# Patient Record
Sex: FEMALE | Race: BLACK OR AFRICAN AMERICAN | Employment: OTHER | URBAN - METROPOLITAN AREA
[De-identification: names, ages, dates, MRNs, and addresses within clinical notes are randomized per-mention and may not be internally consistent; named-entity substitution may affect disease eponyms.]

---

## 2019-07-12 ENCOUNTER — TELEPHONE (OUTPATIENT)
Dept: FAMILY MEDICINE CLINIC | Age: 29
End: 2019-07-12

## 2021-02-19 ENCOUNTER — TELEPHONE (OUTPATIENT)
Dept: FAMILY MEDICINE CLINIC | Age: 31
End: 2021-02-19

## 2021-02-19 NOTE — TELEPHONE ENCOUNTER
Patient has not been seen since 2016 and would   like to reestablish care. Would also like a full body PE and labs if   possible. Did not have insurance card at time of call as well.     Please advise  Thank you

## 2021-02-19 NOTE — TELEPHONE ENCOUNTER
----- Message from Jeanne Murguia sent at 2/19/2021  4:59 PM EST -----  Subject: Message to Provider    QUESTIONS  Information for Provider? Patient has not been seen since 2016 and would   like to reestablish care. Would also like a full body PE and labs if   possible. Did not have insurance card at time of call as well.  ---------------------------------------------------------------------------  --------------  CALL BACK INFO  What is the best way for the office to contact you? OK to leave message on   voicemail  Preferred Call Back Phone Number? [phone]  ---------------------------------------------------------------------------  --------------  SCRIPT ANSWERS  Relationship to Patient?  Self

## 2021-03-03 ENCOUNTER — OFFICE VISIT (OUTPATIENT)
Dept: FAMILY MEDICINE CLINIC | Age: 31
End: 2021-03-03
Payer: MEDICAID

## 2021-03-03 VITALS
DIASTOLIC BLOOD PRESSURE: 82 MMHG | RESPIRATION RATE: 18 BRPM | HEIGHT: 64 IN | BODY MASS INDEX: 34.66 KG/M2 | HEART RATE: 75 BPM | SYSTOLIC BLOOD PRESSURE: 118 MMHG | TEMPERATURE: 97.3 F | OXYGEN SATURATION: 98 % | WEIGHT: 203 LBS

## 2021-03-03 DIAGNOSIS — M54.9 UPPER BACK PAIN: ICD-10-CM

## 2021-03-03 DIAGNOSIS — M54.9 MID BACK PAIN: ICD-10-CM

## 2021-03-03 DIAGNOSIS — M25.572 CHRONIC PAIN OF LEFT ANKLE: ICD-10-CM

## 2021-03-03 DIAGNOSIS — B00.9 HERPES SIMPLEX: ICD-10-CM

## 2021-03-03 DIAGNOSIS — E61.1 IRON DEFICIENCY: ICD-10-CM

## 2021-03-03 DIAGNOSIS — Z00.00 ROUTINE GENERAL MEDICAL EXAMINATION AT A HEALTH CARE FACILITY: Primary | ICD-10-CM

## 2021-03-03 DIAGNOSIS — G89.29 CHRONIC PAIN OF LEFT ANKLE: ICD-10-CM

## 2021-03-03 DIAGNOSIS — Z23 NEED FOR VACCINE FOR DT (DIPHTHERIA-TETANUS): ICD-10-CM

## 2021-03-03 PROCEDURE — 90714 TD VACC NO PRESV 7 YRS+ IM: CPT | Performed by: FAMILY MEDICINE

## 2021-03-03 PROCEDURE — 99385 PREV VISIT NEW AGE 18-39: CPT | Performed by: FAMILY MEDICINE

## 2021-03-03 PROCEDURE — G8484 FLU IMMUNIZE NO ADMIN: HCPCS | Performed by: FAMILY MEDICINE

## 2021-03-03 PROCEDURE — 90471 IMMUNIZATION ADMIN: CPT | Performed by: FAMILY MEDICINE

## 2021-03-03 RX ORDER — CLOTRIMAZOLE AND BETAMETHASONE DIPROPIONATE 10; .64 MG/G; MG/G
CREAM TOPICAL
Qty: 1 TUBE | Refills: 0 | Status: CANCELLED | OUTPATIENT
Start: 2021-03-03

## 2021-03-03 SDOH — ECONOMIC STABILITY: FOOD INSECURITY: WITHIN THE PAST 12 MONTHS, YOU WORRIED THAT YOUR FOOD WOULD RUN OUT BEFORE YOU GOT MONEY TO BUY MORE.: NEVER TRUE

## 2021-03-03 SDOH — ECONOMIC STABILITY: INCOME INSECURITY: HOW HARD IS IT FOR YOU TO PAY FOR THE VERY BASICS LIKE FOOD, HOUSING, MEDICAL CARE, AND HEATING?: NOT VERY HARD

## 2021-03-03 ASSESSMENT — PATIENT HEALTH QUESTIONNAIRE - PHQ9
SUM OF ALL RESPONSES TO PHQ QUESTIONS 1-9: 0
2. FEELING DOWN, DEPRESSED OR HOPELESS: 0
SUM OF ALL RESPONSES TO PHQ9 QUESTIONS 1 & 2: 0
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
## 2021-03-06 ENCOUNTER — HOSPITAL ENCOUNTER (OUTPATIENT)
Dept: GENERAL RADIOLOGY | Age: 31
Discharge: HOME | End: 2021-03-06
Payer: MEDICAID

## 2021-03-06 ENCOUNTER — HOSPITAL ENCOUNTER (OUTPATIENT)
Age: 31
Discharge: HOME | End: 2021-03-06
Payer: MEDICAID

## 2021-03-06 DIAGNOSIS — Z00.00 ROUTINE GENERAL MEDICAL EXAMINATION AT A HEALTH CARE FACILITY: ICD-10-CM

## 2021-03-06 DIAGNOSIS — E61.1 IRON DEFICIENCY: ICD-10-CM

## 2021-03-06 DIAGNOSIS — G89.29 CHRONIC PAIN OF LEFT ANKLE: ICD-10-CM

## 2021-03-06 DIAGNOSIS — M25.572 CHRONIC PAIN OF LEFT ANKLE: ICD-10-CM

## 2021-03-06 LAB
A/G RATIO: 1.3 (ref 1.1–2.2)
ALBUMIN SERPL-MCNC: 4.3 G/DL (ref 3.4–5)
ALP BLD-CCNC: 62 U/L (ref 40–129)
ALT SERPL-CCNC: 14 U/L (ref 10–40)
ANION GAP SERPL CALCULATED.3IONS-SCNC: 9 MMOL/L (ref 3–16)
AST SERPL-CCNC: 17 U/L (ref 15–37)
BILIRUB SERPL-MCNC: 0.4 MG/DL (ref 0–1)
BUN BLDV-MCNC: 12 MG/DL (ref 7–20)
CALCIUM SERPL-MCNC: 9 MG/DL (ref 8.3–10.6)
CHLORIDE BLD-SCNC: 102 MMOL/L (ref 99–110)
CHOLESTEROL, TOTAL: 189 MG/DL (ref 0–199)
CO2: 23 MMOL/L (ref 21–32)
CREAT SERPL-MCNC: 0.7 MG/DL (ref 0.6–1.1)
GFR AFRICAN AMERICAN: >60
GFR NON-AFRICAN AMERICAN: >60
GLOBULIN: 3.4 G/DL
GLUCOSE BLD-MCNC: 79 MG/DL (ref 70–99)
HCT VFR BLD CALC: 37 % (ref 36–48)
HDLC SERPL-MCNC: 54 MG/DL (ref 40–60)
HEMOGLOBIN: 12.4 G/DL (ref 12–16)
LDL CHOLESTEROL CALCULATED: 125 MG/DL
MCH RBC QN AUTO: 28.7 PG (ref 26–34)
MCHC RBC AUTO-ENTMCNC: 33.5 G/DL (ref 31–36)
MCV RBC AUTO: 85.5 FL (ref 80–100)
PDW BLD-RTO: 13.6 % (ref 12.4–15.4)
PLATELET # BLD: 220 K/UL (ref 135–450)
PMV BLD AUTO: 8.2 FL (ref 5–10.5)
POTASSIUM SERPL-SCNC: 3.9 MMOL/L (ref 3.5–5.1)
RBC # BLD: 4.32 M/UL (ref 4–5.2)
SODIUM BLD-SCNC: 134 MMOL/L (ref 136–145)
TOTAL PROTEIN: 7.7 G/DL (ref 6.4–8.2)
TRIGL SERPL-MCNC: 52 MG/DL (ref 0–150)
TSH SERPL DL<=0.05 MIU/L-ACNC: 1.69 UIU/ML (ref 0.27–4.2)
VLDLC SERPL CALC-MCNC: 10 MG/DL
WBC # BLD: 3.5 K/UL (ref 4–11)

## 2021-03-06 PROCEDURE — 80061 LIPID PANEL: CPT

## 2021-03-06 PROCEDURE — 73610 X-RAY EXAM OF ANKLE: CPT

## 2021-03-06 PROCEDURE — 36415 COLL VENOUS BLD VENIPUNCTURE: CPT

## 2021-03-06 PROCEDURE — 85027 COMPLETE CBC AUTOMATED: CPT

## 2021-03-06 PROCEDURE — 80053 COMPREHEN METABOLIC PANEL: CPT

## 2021-03-06 PROCEDURE — 84443 ASSAY THYROID STIM HORMONE: CPT

## 2021-03-08 DIAGNOSIS — M25.572 CHRONIC PAIN OF LEFT ANKLE: Primary | ICD-10-CM

## 2021-03-08 DIAGNOSIS — G89.29 CHRONIC PAIN OF LEFT ANKLE: Primary | ICD-10-CM

## 2021-03-24 ENCOUNTER — OFFICE VISIT (OUTPATIENT)
Dept: ORTHOPEDIC SURGERY | Age: 31
End: 2021-03-24
Payer: MEDICAID

## 2021-03-24 VITALS — WEIGHT: 203 LBS | TEMPERATURE: 97.9 F | BODY MASS INDEX: 34.66 KG/M2 | HEIGHT: 64 IN

## 2021-03-24 DIAGNOSIS — M25.572 LEFT ANKLE PAIN, UNSPECIFIED CHRONICITY: Primary | ICD-10-CM

## 2021-03-24 PROCEDURE — 99203 OFFICE O/P NEW LOW 30 MIN: CPT | Performed by: ORTHOPAEDIC SURGERY

## 2021-03-24 PROCEDURE — G8428 CUR MEDS NOT DOCUMENT: HCPCS | Performed by: ORTHOPAEDIC SURGERY

## 2021-03-24 PROCEDURE — G8484 FLU IMMUNIZE NO ADMIN: HCPCS | Performed by: ORTHOPAEDIC SURGERY

## 2021-03-24 PROCEDURE — G8417 CALC BMI ABV UP PARAM F/U: HCPCS | Performed by: ORTHOPAEDIC SURGERY

## 2021-03-24 RX ORDER — NAPROXEN 500 MG/1
500 TABLET ORAL 2 TIMES DAILY WITH MEALS
Qty: 60 TABLET | Refills: 0 | Status: SHIPPED | OUTPATIENT
Start: 2021-03-24 | End: 2021-09-10

## 2021-03-29 ENCOUNTER — TELEPHONE (OUTPATIENT)
Dept: ORTHOPEDIC SURGERY | Age: 31
End: 2021-03-29

## 2021-03-29 PROBLEM — M25.572 LEFT ANKLE PAIN: Status: ACTIVE | Noted: 2021-03-29
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
## 2021-05-10 ENCOUNTER — TELEPHONE (OUTPATIENT)
Dept: FAMILY MEDICINE CLINIC | Age: 31
End: 2021-05-10

## 2021-05-10 NOTE — TELEPHONE ENCOUNTER
I believe she sees a Gynecologist - Dr. Nitza Ruiz at [facility]. Please refer her to Gynecologist's office.

## 2021-09-10 ENCOUNTER — VIRTUAL VISIT (OUTPATIENT)
Dept: FAMILY MEDICINE CLINIC | Age: 31
End: 2021-09-10
Payer: MEDICAID

## 2021-09-10 DIAGNOSIS — M79.644 PAIN IN FINGER OF RIGHT HAND: ICD-10-CM

## 2021-09-10 DIAGNOSIS — M25.532 LEFT WRIST PAIN: Primary | ICD-10-CM

## 2021-09-10 PROCEDURE — 1036F TOBACCO NON-USER: CPT | Performed by: FAMILY MEDICINE

## 2021-09-10 PROCEDURE — G8427 DOCREV CUR MEDS BY ELIG CLIN: HCPCS | Performed by: FAMILY MEDICINE

## 2021-09-10 PROCEDURE — 99214 OFFICE O/P EST MOD 30 MIN: CPT | Performed by: FAMILY MEDICINE

## 2021-09-10 PROCEDURE — G8417 CALC BMI ABV UP PARAM F/U: HCPCS | Performed by: FAMILY MEDICINE

## 2021-09-10 NOTE — LETTER
[address]  Phone: [phone]  Fax: [phone]    Jaxon Israel MD        September 10, 2021     Patient: Anna Sy   YOB: 1990   Date of Visit: 9/10/2021       To Whom it May Concern:    Anna Sy was seen virtually on 9/10/2021. She is to remain off work until Monday , 9/13/21. If you have any questions or concerns, please don't hesitate to call.     Sincerely,         Jaxon Israel MD

## 2021-09-10 NOTE — PROGRESS NOTES
9/10/2021    TELEHEALTH EVALUATION -- Audio/Visual (During KSVTV-87 public health emergency)    Due to [place] 19 outbreak, patient's office visit was converted to a virtual visit. Patient was contacted and agreed to proceed with a virtual visit via [employer]. me  The risks and benefits of converting to a virtual visit were discussed in light of the current infectious disease epidemic. Patient also understood that insurance coverage and co-pays are up to their individual insurance plans. HPI:    Chauncy Severin (:  1990) has requested an audio/video evaluation for the following concern(s):    Left wrist pain. She fell down her back steps on her way to a concert on 18. She noticed the pain the next day and called off work. She iced it and used bandage and propped it up. She used Tylenol . She did not go to ED. She is a . She does not have a classroom and was using her sick time . Her son , 5 yo, had COVID. She has a note for last week for work. She is not mentally ready to go back due to working at [employer]. She was to go back on 21 per her prior note. Her son was negative on 21. He had symptoms started on 21 and likely got COVID from school exposure. Denies fever, chest pain , shortness of breath or cough. Review of Systems    Prior to Visit Medications    Not on File       No Known Allergies    Social History     Tobacco Use    Smoking status: Never Smoker    Smokeless tobacco: Never Used   Substance Use Topics    Alcohol use:  Yes     Alcohol/week: 2.5 standard drinks     Types: 3 drink(s) per week    Drug use: No        Past Medical History:   Diagnosis Date    Acne     Chlamydia infection     Dysmenorrhea     Fibrocystic breast disease     Fracture of radius, left, closed     Hammertoe     Bilateral 5th digits    Herpes simplex     Iron deficiency     Pityriasis rosea        Past Surgical History:   Procedure Laterality Date    OTHER SURGICAL HISTORY  7/28/2014    HAMMERTOE DEFORMITY REPAIR BILATERAL 5TH DIGITS    UMBILICAL HERNIA REPAIR  1991       Health Maintenance   Topic Date Due    COVID-19 Vaccine (1) Never done    Cervical cancer screen  Never done    Flu vaccine (1) 09/01/2021    DTaP/Tdap/Td vaccine (8 - Td or Tdap) 03/03/2031    Hepatitis B vaccine  Completed    Hib vaccine  Completed    Meningococcal (ACWY) vaccine  Completed    Hepatitis C screen  Completed    HIV screen  Completed    Hepatitis A vaccine  Aged Out    Pneumococcal 0-64 years Vaccine  Aged Out    Varicella vaccine  Discontinued       Family History   Problem Relation Age of Onset    High Blood Pressure Mother     Diabetes Mother     High Blood Pressure Father        PHYSICAL EXAMINATION:    Vital Signs: (As obtained by patient/caregiver or practitioner observation)     Patient-Reported Vitals 9/10/2021   Patient-Reported Weight 200         Heart rate= 80  Respiratory rate= 14 Temperature= 98      Constitutional:  Appears well-developed and well-nourished. No apparent distress                              Mental status:  Alert and awake. Oriented to person/place/time. Able to follow commands       Eyes: EOM intact. Sclera-normal. No erythema of conjunctiva. No eye discharge. HENT: Normocephalic, atraumatic. Mouth/Throat: normal. Mucous membranes are moist.      External Ears: Normal       Neck: No visualized mass      Pulmonary/Chest:  Respiratory effort normal.  No visualized signs of difficulty breathing or respiratory distress         Musculoskeletal:   Normal gait with no signs of ataxia. Normal range of motion of neck. Left wrist: decreased ROM due to pain = flexion and hyperextension. Neurological:         No Facial Asymmetry (Cranial nerve 7 motor function) (limited exam to video visit) .   No gaze palsy              Skin:                     No significant exanthematous lesions or discoloration noted on facial skin                                        Psychiatric:          Normal Affect. No Hallucinations           Other pertinent observable physical exam findings:       ASSESSMENT/PLAN:  1. Left wrist pain  - Moist heat . ROM exercises. Modify activities. - Aleve 1-2 po bid prn.   - XR WRIST LEFT (MIN 3 VIEWS); Future and follow up after completed/ prn.   - Note given for work to remain off work until 9/13/21.   - Difficult exam given virtual.  Recommended in office exam if pain persists. 2. Pain in finger of right hand  - Moist heat . ROM exercises. Modify activities. - XR FINGER RIGHT (MIN 2 VIEWS); Future and follow up after completed/ prn.   - Aleve 1-2 po bid prn. The time that was spent with the family/patient addressing care on this video call was 15 minutes. An  electronic signature was used to authenticate this note. --Arnav Batres MD on 9/10/2021 at 3:00 PM      Pursuant to the emergency declaration under the [address] [place] [address], Pending sale to [facility] waiver authority and the [employer] Act, this Virtual  Visit was conducted, with patient's consent, to reduce the patient's risk of exposure to COVID-19 and provide continuity of care for an established patient. Services were provided through a video synchronous discussion virtually to substitute for in-person clinic visit.

## 2021-12-29 ENCOUNTER — OFFICE VISIT (OUTPATIENT)
Dept: SURGERY | Age: 31
End: 2021-12-29
Payer: MEDICAID

## 2021-12-29 VITALS
HEART RATE: 75 BPM | OXYGEN SATURATION: 98 % | SYSTOLIC BLOOD PRESSURE: 111 MMHG | RESPIRATION RATE: 16 BRPM | TEMPERATURE: 98 F | HEIGHT: 64 IN | DIASTOLIC BLOOD PRESSURE: 80 MMHG | WEIGHT: 204.4 LBS | BODY MASS INDEX: 34.89 KG/M2

## 2021-12-29 DIAGNOSIS — N62 MACROMASTIA: Primary | ICD-10-CM

## 2021-12-29 PROCEDURE — G8484 FLU IMMUNIZE NO ADMIN: HCPCS | Performed by: SURGERY

## 2021-12-29 PROCEDURE — G8427 DOCREV CUR MEDS BY ELIG CLIN: HCPCS | Performed by: SURGERY

## 2021-12-29 PROCEDURE — G8417 CALC BMI ABV UP PARAM F/U: HCPCS | Performed by: SURGERY

## 2021-12-29 PROCEDURE — 1036F TOBACCO NON-USER: CPT | Performed by: SURGERY

## 2021-12-29 PROCEDURE — 99204 OFFICE O/P NEW MOD 45 MIN: CPT | Performed by: SURGERY

## 2021-12-29 NOTE — PROGRESS NOTES
[facility]    CC: Symptomatic macromastia    REFERRING PHYSICIAN: Wilfrido Ventura MD    HPI: This is a 27 y.o.  female who presents to clinic with desire for breast reduction. Her pertinent breast history include the following:    Last Mammogram: None    Current bra size: 40G  Desired bra size: As small as possible \"A cup is ok\"  Pregnancies/miscarriages:  / 0  Breast feeding: no future plans    Breast Symptoms:    Macromastia Symptoms:  Upper back pain: Yes      Bra strap grooves: Yes      Wears supportive bras (>1 yr): Yes      Tried conservative measures (PT, MDs,etc): Yes (Chiropractor and PT)      Intertrigo: Yes      Head/neck pain: Yes      Headaches: No      Paresthesias of hands/fingers: No      PMHx:   Past Medical History:   Diagnosis Date    Acne     Chlamydia infection     Dysmenorrhea     Fibrocystic breast disease     Fracture of radius, left, closed     Hammertoe     Bilateral 5th digits    Herpes simplex     Iron deficiency     Pityriasis rosea      PSHx:   Past Surgical History:   Procedure Laterality Date    OTHER SURGICAL HISTORY  2014    HAMMERTOE DEFORMITY REPAIR BILATERAL 5TH DIGITS    UMBILICAL HERNIA REPAIR       ALLERGIES: No Known Allergies  SOCIAL: No tobacco, occ ETOH, no IVD  FHx: Past history of breast CA: Yes (niece)   Past family members with breast reduction: Yes (maternal aunt)   Past family members with breast augmentation:No    Meds:   No current outpatient medications on file. No current facility-administered medications for this visit. ROS   Constitutional: Negative for chills and fever. HENT: Negative for congestion, facial swelling, and voice change. Eyes: Negative for photophobia and visual disturbance. Respiratory: Negative for apnea, cough, chest tightness and shortness of breath. Cardiovascular: Negative for chest pain and palpitations.    Gastrointestinal: Negative for dysphagia and early satiety. Genitourinary: Negative for difficulty urinating, dysuria, flank pain, frequency and hematuria. Musculoskeletal: See HPI. Skin: Negative for color change, pallor and rash. Endocrine: negative for tremors, temperature intolerance or polydipsia. Allergic/Immunologic: Negative for new environmental or food allergies. Neurological: See HPI. Hematological: Negative for adenopathy. Psychiatric/Behavioral: Negative for agitation and confusion. EXAM     /80   Pulse 75   Temp 98 °F (36.7 °C)   Resp 16   Ht 5' 4\" (1.626 m)   Wt 204 lb 6.4 oz (92.7 kg)   SpO2 98%   BMI 35.09 kg/m²     GEN: NAD, pleasant, obese  CVS: RRR  PULM: No respiratory distress  HEENT: PERRLA/EOMI; wearing mask hearing appears within normal limits  NECK: Supple with trachea in midline, no masses  EXT: No lymphedema noted  ABD: soft/NT/obese  No palpable hernia  NEURO: No focal deficits, no obvious CN deficits  BACK: Bilateral latiss muscle intact  BREAST: Right larger than Left   R  Ptosis ndgndrndanddndend:nd nd2nd Palpable masses: No     Nipple retraction: No     Palpable axillary lymphadenopathy: No     SN-N: 35.8 cm     N-IMF: 15.8 cm     Breast width: 16.8 cm         L  Ptosis ndgndrndanddndend:nd nd2nd Palpable masses: No     Nipple retraction: No     Palpable axillary lymphadenopathy: No     SN-N: 35.5 cm     N-IMF: 14 cm     Breast width: 17.3 cm      Estimated Reduction Volume (Schnur scale): 600 grams (each)    RADIOLOGY: None    IMP: 27 y.o. female with symptomatic macromastia  PLAN:Would benefit from breast reduction for functional improvement. Additionally, would benefit from abdominoplasty with rectus plication for improvement in her abdominal contour. However, she is going to work to decrease her weight. Will then return in a few months and once weight stability obtained, will submit to insurance and schedule. A discussion regarding surgical options including: reduction mammaplasty was performed with the patient.  Her symptoms of macromastia were discussed in detail and that surgical intervention is focused primarily on relieving upper torso complaints. Clinical photos were obtained. Additionally,discussion regarding the risks including, but not limited to: bleeding (potentially requiring transfusion or reoperation), infection, seroma, reoperation, poor cosmetic outcome, scarring, revisional surgery, nipple loss/complication, nipple malposition, diminished sensation, inability to breastfeed, VTE (DVT/PE), and death was performed. All questions were answered in a satisfactory manner. The patient was counseled at length about the risks of adolfo Covid-19 during their perioperative period and any recovery window from their procedure. The patient was made aware that adolfo Covid-19  may worsen their prognosis for recovering from their procedure  and lend to a higher morbidity and/or mortality risk. All material risks, benefits, and reasonable alternatives including postponing the procedure were discussed. The patient does wish to proceed with the procedure at this time.     Camila Rg MD  [address] [address] Reconstructive Surgery  12/29/21

## 2022-03-02 ENCOUNTER — OFFICE VISIT (OUTPATIENT)
Dept: SURGERY | Age: 32
End: 2022-03-02
Payer: MEDICAID

## 2022-03-02 VITALS
SYSTOLIC BLOOD PRESSURE: 122 MMHG | WEIGHT: 202 LBS | TEMPERATURE: 98 F | HEIGHT: 64 IN | BODY MASS INDEX: 34.49 KG/M2 | HEART RATE: 65 BPM | DIASTOLIC BLOOD PRESSURE: 83 MMHG | OXYGEN SATURATION: 98 % | RESPIRATION RATE: 16 BRPM

## 2022-03-02 DIAGNOSIS — N62 MACROMASTIA: Primary | ICD-10-CM

## 2022-03-02 PROCEDURE — G8427 DOCREV CUR MEDS BY ELIG CLIN: HCPCS | Performed by: SURGERY

## 2022-03-02 PROCEDURE — G8417 CALC BMI ABV UP PARAM F/U: HCPCS | Performed by: SURGERY

## 2022-03-02 PROCEDURE — 1036F TOBACCO NON-USER: CPT | Performed by: SURGERY

## 2022-03-02 PROCEDURE — 99214 OFFICE O/P EST MOD 30 MIN: CPT | Performed by: SURGERY

## 2022-03-02 PROCEDURE — G8484 FLU IMMUNIZE NO ADMIN: HCPCS | Performed by: SURGERY

## 2022-03-02 NOTE — LETTER
Surgery Schedule Request Form  [facility]  [address] [place] [address]. [address] [place] [address]  DATE OF SURGERY: 2022   TIME OF SURGERY: 1:30 pm     Confirmation#:__________________        Surgeon Name: Trev Trotter MD    Phone: [phone]     Fax: [phone]  Procedure Name: 1) Bilateral breast reduction  CPT CODES (required for scheduling):   DIAGNOSIS: N62  Macromastia    LENGTH OF PROCEDURE: 3.5 hours  Patient Status: 23 hour observation    Labs Needed:   CBC _x__  PT/PTT_x__ INR __x__  CMP __x_ EKG _x__   Urine Hcg _x__            PATIENT NAME: Leigh Shea            YOB: 1990  SEX: female   SS #:   PHONE: [phone] (home)     Pre-Op to be done by: PCP  Cardiac Clearance Done by: per PCP    Pt Position:  supine  Patient to meet with Anesthesiology prior to surgery: no     Medications to be stopped 5 days before surgery: anticoagulation     Ancef 2 gm IV OCTOR (NOTE:  If patient weight is > 120 kg, Administer 3 gm)  Other Orders: transexemic acid 1g IV OCTOR; SA; No Decadron    INSURANCE: [facility]                                              SUBSCRIBER NAME: Self   MEMBER ID:   [id number]                            AUTHORIZATION #: [id number]    PCP: Bryan Escudero MD                                      ANESTHESIA:  Joel Remy MD                             FAX TO: [phone]   QUESTIONS? CALL: [address]   Fax   [phone]            Date Of Procedure: 2022    PATIENT:       Leigh Shea                    :  1990      No Known Allergies    No.   PHYSICIAN ORDERS   HUC/  RN      ORDERS WITH CHECK BOXES MUST BE SELECTED. ALL OTHER ORDERS WILL BE AUTOMATICALLY INITIATED. Date / Time of Order:   3/7/22   1:44 PM          Procedure: Bilateral Breast Reduction        1.  Cefazolin (Ancef) 2 gm IV OCTOR   ** NOTE:  If patient weight is > 120 kg, Administer 3 gm         2. ** If PCN allergy, then Clindamycin 600mg IV OCTOR.   ** If prior history of MRSA, Vancomycin 1g IV OCTOR (please check with renal function prior to administration)       3.  Other orders: transexemic acid 1g IV OCTOR; SA; No Decadron     Physician / Surgeon:  Rebekah Garcia MD  Office Ph:  [phone]       Physician Signature:     9/07

## 2022-03-02 NOTE — PROGRESS NOTES
[facility]    CC: Symptomatic macromastia    REFERRING PHYSICIAN: Tremaine Gunter MD    HPI: This is a 34 y.o.  female who presents to clinic with desire for breast reduction. Since her last evaluation, she has lost 2 lbs. Her pertinent breast history include the following:    Last Mammogram: None    Current bra size: 40G  Desired bra size: As small as possible \"A cup is ok\"  Pregnancies/miscarriages:   Breast feeding: no future plans    Breast Symptoms:    Macromastia Symptoms:  Upper back pain: Yes      Bra strap grooves: Yes      Wears supportive bras (>1 yr): Yes      Tried conservative measures (PT, MDs,etc): Yes (Chiropractor and PT)      Intertrigo: Yes      Head/neck pain: Yes      Headaches: No      Paresthesias of hands/fingers: No    PMHx:   Past Medical History:   Diagnosis Date    Acne     Chlamydia infection     Dysmenorrhea     Fibrocystic breast disease     Fracture of radius, left, closed     Hammertoe     Bilateral 5th digits    Herpes simplex     Iron deficiency     Pityriasis rosea      PSHx:   Past Surgical History:   Procedure Laterality Date    OTHER SURGICAL HISTORY  2014    HAMMERTOE DEFORMITY REPAIR BILATERAL 5TH DIGITS    UMBILICAL HERNIA REPAIR       ALLERGIES: No Known Allergies  SOCIAL: No tobacco, occ ETOH, no IVD  FHx: Past history of breast CA: Yes (niece)   Past family members with breast reduction: Yes (maternal aunt)   Past family members with breast augmentation:No    Meds:   No current outpatient medications on file. No current facility-administered medications for this visit. ROS   Constitutional: Negative for chills and fever. HENT: Negative for congestion, facial swelling, and voice change. Eyes: Negative for photophobia and visual disturbance. Respiratory: Negative for apnea, cough, chest tightness and shortness of breath. Cardiovascular: Negative for chest pain and palpitations. Gastrointestinal: Negative for dysphagia and early satiety. Genitourinary: Negative for difficulty urinating, dysuria, flank pain, frequency and hematuria. Musculoskeletal: See HPI. Skin: Negative for color change, pallor and rash. Endocrine: negative for tremors, temperature intolerance or polydipsia. Allergic/Immunologic: Negative for new environmental or food allergies. Neurological: See HPI. Hematological: Negative for adenopathy. Psychiatric/Behavioral: Negative for agitation and confusion. EXAM     /83   Pulse 65   Temp 98 °F (36.7 °C)   Resp 16   Ht 5' 4\" (1.626 m)   Wt 202 lb (91.6 kg)   SpO2 98%   BMI 34.67 kg/m²     GEN: NAD, pleasant, obese  CVS: RRR  PULM: No respiratory distress  HEENT: PERRLA/EOMI; wearing mask hearing appears within normal limits  NECK: Supple with trachea in midline, no masses  EXT: No lymphedema noted  ABD: soft/NT/obese  No palpable hernia  NEURO: No focal deficits, no obvious CN deficits  BACK: Bilateral latiss muscle intact  BREAST: Right larger than Left   R  Ptosis thgthrthathdtheth:th th4th Palpable masses: No     Nipple retraction: No     Palpable axillary lymphadenopathy: No     SN-N: 35.8 cm     N-IMF: 15.8 cm     Breast width: 16.8 cm         L  Ptosis thgthrthathdtheth:th th4th Palpable masses: No     Nipple retraction: No     Palpable axillary lymphadenopathy: No     SN-N: 35.5 cm     N-IMF: 14 cm     Breast width: 17.3 cm      Estimated Reduction Volume (Schnur scale): 600 grams (each)    RADIOLOGY: None    IMP: 34 y.o. female with symptomatic macromastia  PLAN:Would benefit from breast reduction for functional improvement. Additionally, would benefit from abdominoplasty with rectus plication for improvement in her abdominal contour. However, she is going to work to decrease her weight and will perform this in the future potentially  Will submit to insurance and schedule.     A discussion regarding surgical options including: reduction mammaplasty was performed with the patient. Her symptoms of macromastia were discussed in detail and that surgical intervention is focused primarily on relieving upper torso complaints. Clinical photos were obtained. Additionally,discussion regarding the risks including, but not limited to: bleeding (potentially requiring transfusion or reoperation), infection, seroma, reoperation, poor cosmetic outcome, scarring, revisional surgery, nipple loss/complication, nipple malposition, diminished sensation, inability to breastfeed, VTE (DVT/PE), and death was performed. All questions were answered in a satisfactory manner. The patient was counseled at length about the risks of adolfo Covid-19 during their perioperative period and any recovery window from their procedure. The patient was made aware that adolfo Covid-19  may worsen their prognosis for recovering from their procedure  and lend to a higher morbidity and/or mortality risk. All material risks, benefits, and reasonable alternatives including postponing the procedure were discussed. The patient does wish to proceed with the procedure at this time.     Delvis Bella MD  [facility]  03/02/22

## 2022-03-03 ENCOUNTER — TELEPHONE (OUTPATIENT)
Dept: SURGERY | Age: 32
End: 2022-03-03

## 2022-03-03 NOTE — TELEPHONE ENCOUNTER
The patient was in the office to Dr. Thuy Botello yesterday. PLAN:Would benefit from breast reduction for functional improvement. Additionally, would benefit from abdominoplasty with rectus plication for improvement in her abdominal contour. However, she is going to work to decrease her weight and will perform this in the future potentially  Will submit to insurance and schedule.     I received the surgery letter. I spoke with the patient at the home number listed to discuss needed medical records. She stated that she has not been seen by a  Chiropractor or for physical therapy in years, since her 18's. She stated that she has seen Dr. Reema Giron for Intertrigo and received treatment from her. The patient is aware that I will view her records and submit anything that might support the pre certification. I spoke with Alix Lock at [facility] ([phone]) to see if CPT Code . The code does require pre certification, which I started during our call. The case will be I will upload the clinicals to the online provider portal.    Date Range 3-3-2022 to 6-1-2022    Pending Authorization #  [id number]    I will leave this phone note open.

## 2022-03-04 NOTE — TELEPHONE ENCOUNTER
I received a fax from [facility] dated 3-3-2022. The fax asked that I upload clinicals to the [facility] Provider [address] [place] [address]. The clinicals and pictures that I uploaded are saved on my computer. I will leave this phone note open.

## 2022-03-07 NOTE — TELEPHONE ENCOUNTER
I received a fax from [facility] dated 3-7-2022. I will scan the fax into Epic under the media tab. APPROVED  Reference # [id number]  CPT Code 72225  Date Range 3-3-2022 to 6-1-2022    I spoke with the patient at the home number listed. The patient is now scheduled for surgery with  on 4-. The patient is aware of H&P. The patient is aware that she will need to be COVID tested on 4-. She is aware that we will not accept a RAPID COVID test result and that the result will need to be faxed to us. The patient is scheduled for her post op appointment 4-. I will submit the surgery letter today. I will mail the surgery information and instructions to the patient today. I will close this phone note.

## 2022-03-29 ENCOUNTER — OFFICE VISIT (OUTPATIENT)
Dept: FAMILY MEDICINE CLINIC | Age: 32
End: 2022-03-29
Payer: MEDICAID

## 2022-03-29 VITALS
DIASTOLIC BLOOD PRESSURE: 74 MMHG | SYSTOLIC BLOOD PRESSURE: 136 MMHG | WEIGHT: 204.6 LBS | TEMPERATURE: 96.4 F | OXYGEN SATURATION: 98 % | BODY MASS INDEX: 35.12 KG/M2 | HEART RATE: 57 BPM

## 2022-03-29 DIAGNOSIS — E61.1 IRON DEFICIENCY: ICD-10-CM

## 2022-03-29 DIAGNOSIS — Z01.818 PREOP EXAMINATION: Primary | ICD-10-CM

## 2022-03-29 DIAGNOSIS — M54.9 UPPER BACK PAIN: ICD-10-CM

## 2022-03-29 DIAGNOSIS — Z01.818 PREOP EXAMINATION: ICD-10-CM

## 2022-03-29 DIAGNOSIS — N62 MACROMASTIA: ICD-10-CM

## 2022-03-29 LAB
A/G RATIO: 1.9 (ref 1.1–2.2)
ALBUMIN SERPL-MCNC: 4.5 G/DL (ref 3.4–5)
ALP BLD-CCNC: 61 U/L (ref 40–129)
ALT SERPL-CCNC: 15 U/L (ref 10–40)
ANION GAP SERPL CALCULATED.3IONS-SCNC: 12 MMOL/L (ref 3–16)
APTT: 31.1 SEC (ref 26.2–38.6)
AST SERPL-CCNC: 15 U/L (ref 15–37)
BILIRUB SERPL-MCNC: <0.2 MG/DL (ref 0–1)
BUN BLDV-MCNC: 10 MG/DL (ref 7–20)
CALCIUM SERPL-MCNC: 9.1 MG/DL (ref 8.3–10.6)
CHLORIDE BLD-SCNC: 105 MMOL/L (ref 99–110)
CO2: 22 MMOL/L (ref 21–32)
CREAT SERPL-MCNC: 0.7 MG/DL (ref 0.6–1.1)
GFR AFRICAN AMERICAN: >60
GFR NON-AFRICAN AMERICAN: >60
GLUCOSE BLD-MCNC: 89 MG/DL (ref 70–99)
HCT VFR BLD CALC: 38.4 % (ref 36–48)
HEMOGLOBIN: 12.5 G/DL (ref 12–16)
INR BLD: 1.03 (ref 0.88–1.12)
MCH RBC QN AUTO: 28.4 PG (ref 26–34)
MCHC RBC AUTO-ENTMCNC: 32.6 G/DL (ref 31–36)
MCV RBC AUTO: 87 FL (ref 80–100)
PDW BLD-RTO: 14.5 % (ref 12.4–15.4)
PLATELET # BLD: 246 K/UL (ref 135–450)
PMV BLD AUTO: 8.1 FL (ref 5–10.5)
POTASSIUM SERPL-SCNC: 4.4 MMOL/L (ref 3.5–5.1)
PROTHROMBIN TIME: 11.7 SEC (ref 9.9–12.7)
RBC # BLD: 4.42 M/UL (ref 4–5.2)
SODIUM BLD-SCNC: 139 MMOL/L (ref 136–145)
TOTAL PROTEIN: 6.9 G/DL (ref 6.4–8.2)
WBC # BLD: 3 K/UL (ref 4–11)

## 2022-03-29 PROCEDURE — 99242 OFF/OP CONSLTJ NEW/EST SF 20: CPT | Performed by: FAMILY MEDICINE

## 2022-03-29 PROCEDURE — G8427 DOCREV CUR MEDS BY ELIG CLIN: HCPCS | Performed by: FAMILY MEDICINE

## 2022-03-29 PROCEDURE — G8417 CALC BMI ABV UP PARAM F/U: HCPCS | Performed by: FAMILY MEDICINE

## 2022-03-29 PROCEDURE — G8484 FLU IMMUNIZE NO ADMIN: HCPCS | Performed by: FAMILY MEDICINE

## 2022-03-29 SDOH — ECONOMIC STABILITY: FOOD INSECURITY: WITHIN THE PAST 12 MONTHS, THE FOOD YOU BOUGHT JUST DIDN'T LAST AND YOU DIDN'T HAVE MONEY TO GET MORE.: NEVER TRUE

## 2022-03-29 SDOH — ECONOMIC STABILITY: FOOD INSECURITY: WITHIN THE PAST 12 MONTHS, YOU WORRIED THAT YOUR FOOD WOULD RUN OUT BEFORE YOU GOT MONEY TO BUY MORE.: NEVER TRUE

## 2022-03-29 ASSESSMENT — PATIENT HEALTH QUESTIONNAIRE - PHQ9
2. FEELING DOWN, DEPRESSED OR HOPELESS: 0
SUM OF ALL RESPONSES TO PHQ QUESTIONS 1-9: 0
SUM OF ALL RESPONSES TO PHQ QUESTIONS 1-9: 0
1. LITTLE INTEREST OR PLEASURE IN DOING THINGS: 0
SUM OF ALL RESPONSES TO PHQ QUESTIONS 1-9: 0
SUM OF ALL RESPONSES TO PHQ QUESTIONS 1-9: 0
SUM OF ALL RESPONSES TO PHQ9 QUESTIONS 1 & 2: 0

## 2022-03-29 ASSESSMENT — SOCIAL DETERMINANTS OF HEALTH (SDOH): HOW HARD IS IT FOR YOU TO PAY FOR THE VERY BASICS LIKE FOOD, HOUSING, MEDICAL CARE, AND HEATING?: NOT VERY HARD

## 2022-03-29 NOTE — PROGRESS NOTES
Chief Complaint:     Sumeet Castrejon is a 34 y.o. female who presents for a preoperative physical examination. She is scheduled to have bilateral breast reduction done by Dr. Tyler Whaley at [facility] on 4/21/22. History of Present Illness:      Patient with bra size of 40G and likely will decrease to C. She suffers from neck and upper back pain. Patient denies chest pain, palpitations, or shortness of breath . No personal or family history of bleeding, clotting, or anesthesia problems. Past Medical History:   Diagnosis Date    Acne     Chlamydia infection 5/13    Dysmenorrhea     Fibrocystic breast disease     Fracture of radius, left, closed 2000    Hammertoe     Bilateral 5th digits    Herpes simplex     Iron deficiency     Pityriasis rosea         Review of patient's past surgical history indicates:     Past Surgical History:   Procedure Laterality Date    OTHER SURGICAL HISTORY  7/28/2014    HAMMERTOE DEFORMITY REPAIR BILATERAL 5TH DIGITS    UMBILICAL HERNIA REPAIR  1991                                                   No current outpatient medications on file. No current facility-administered medications for this visit. No Known Allergies    Social History     Tobacco Use    Smoking status: Never Smoker    Smokeless tobacco: Never Used   Substance Use Topics    Alcohol use:  Yes     Alcohol/week: 2.5 standard drinks     Types: 3 drink(s) per week    Drug use: No        Family History   Problem Relation Age of Onset    High Blood Pressure Mother     Diabetes Mother     High Blood Pressure Father         Review Of Systems    Skin: no abnormal pigmentation, rash, scaling, itching, masses, hair or nail changes  Eyes: negative  Ears/Nose/Throat: negative  Respiratory: negative  Cardiovascular: negative  Gastrointestinal: negative  Genitourinary: negative  Musculoskeletal: negative  Neurologic: negative  Psychiatric: negative  Hematologic/Lymphatic/Immunologic: negative  Endocrine: negative       Objective: Wt Readings from Last 3 Encounters:   03/29/22 204 lb 9.6 oz (92.8 kg)   03/02/22 202 lb (91.6 kg)   12/29/21 204 lb 6.4 oz (92.7 kg)       /74   Pulse 57   Temp 96.4 °F (35.8 °C)   Wt 204 lb 9.6 oz (92.8 kg)   LMP 03/01/2022   SpO2 98%   BMI 35.12 kg/m²   General appearance - healthy, alert, no distress  Skin - Skin color, texture, turgor normal. No rashes or lesions. Head - Normocephalic. No masses, lesions, tenderness or abnormalities  Eyes - conjunctivae/corneas clear. PERRLA, EOM's intact. Ears - External ears normal. Canals clear. TM's normal.  Nose/Sinuses - Nares normal. Septum midline. Mucosa normal. No drainage or sinus tenderness. Oropharynx - Lips, mucosa, and tongue normal. Teeth and gums normal. Oropharynx  clear  Neck - Neck supple. No adenopathy. Thyroid symmetric, normal size,  Back - Back symmetric, no curvature. ROM normal. No CVA tenderness. Lungs - Percussion normal. Good diaphragmatic excursion. Lungs clear  Heart - Regular rate and rhythm, with no rub, murmur or gallop noted. No edema. Abdomen - Abdomen soft, non-tender. BS normal. No masses, organomegaly  Extremities - Extremities normal. No deformities, edema, or skin discoloration. Musculoskeletal - Spine ROM normal. Muscular strength intact. Peripheral pulses - radial=4/4,, femoral=4/4, popliteal=4/4, dorsalis pedis=4/4,  Neuro - Gait normal. Reflexes normal and symmetric. Sensation grossly normal.  No focal weakness       Assessment:      1. Preop examination  - Medical clearance pending labs. - CBC; Future  - Comprehensive Metabolic Panel; Future  - Protime/INR & PTT; Future    2. Macromastia  - Scheduled for surgery. 3. Upper back pain  - Scheduled for breast reduction. 4. Iron deficiency  - CBC; Future    Per encounter diagnoses  She is medically cleared for surgery and anesthesia.  Avoid Aspirin, non steroidal anti inflammatory medications, including

## 2022-04-08 NOTE — PROGRESS NOTES
Place patient label inside box (if no patient label, complete below)  Name:  :  MR#:   Serene He / PROCEDURE  1. I (we), Candido Martini (Patient Name) authorize DR. Essie Araya (Provider / Tami Cosme) and/or such assistants as may be selected by him/her, to perform the following operation/procedure(s): BILATERAL BREAST REDUCTION        Note: If unable to obtain consent prior to an emergent procedure, document the emergent reason in the medical record. This procedure has been explained to my (our) satisfaction and included in the explanation was:  A) The intended benefit, nature, and extent of the procedure to be performed;  B) The significant risks involved and the probability of success;  C) Alternative procedures and methods of treatment;  D) The dangers and probable consequences of such alternatives (including no procedure or treatment); E) The expected consequences of the procedure on my future health;  F) Whether other qualified individuals would be performing important surgical tasks and/or whether  would be present to advise or support the procedure. I (we) understand that there are other risks of infection and other serious complications in the pre-operative/procedural and postoperative/procedural stages of my (our) care. I (we) have asked all of the questions which I (we) thought were important in deciding whether or not to undergo treatment or diagnosis. These questions have been answered to my (our) satisfaction. I (we) understand that no assurance can be given that the procedure will be a success, and no guarantee or warranty of success has been given to me (us). 2. It has been explained to me (us) that during the course of the operation/procedure, unforeseen conditions may be revealed that necessitate extension of the original procedure(s) or different procedure(s) than those set forth in Paragraph 1.  I (we) years of age, or unable to sign because:   ______________________________________________________________________________________________    Davila If a phone consent is obtained, consent will be documented by using two health care professionals, each affirming that the consenting party has no questions and gives consent for the procedure discussed with the physician/provider.   _____________________          ____________________       _____/_____am/pm   2nd witness to phone consent        Printed name           Date / Time    Informed Consent:  I have provided the explanation described above in section 1 to the patient and/or legal representative.  I have provided the patient and/or legal representative with an opportunity to ask any questions about the proposed operation/procedure.   ___________________________          ____________________         ____/____am/pm  Provider / Proceduralist                            Printed name            Date / Time  Revised 8/2/2021                                                                      Page 2 of 2

## 2022-04-08 NOTE — PROGRESS NOTES
4/8/22 @ 9610 UNABLE TO REACH PT OR LM. MAILBOX FULL. H&P / LABS ON CHART.  ORDERS ENTERED EPIC. /AG

## 2022-04-14 ENCOUNTER — NURSE ONLY (OUTPATIENT)
Dept: SURGERY | Age: 32
End: 2022-04-14

## 2022-04-14 VITALS
WEIGHT: 206 LBS | SYSTOLIC BLOOD PRESSURE: 124 MMHG | TEMPERATURE: 98.7 F | OXYGEN SATURATION: 96 % | BODY MASS INDEX: 34.28 KG/M2 | DIASTOLIC BLOOD PRESSURE: 76 MMHG | HEART RATE: 66 BPM

## 2022-04-14 DIAGNOSIS — N62 MACROMASTIA: Primary | ICD-10-CM

## 2022-04-14 NOTE — PROGRESS NOTES
photo ID. You will be registered at your bedside once brought back to your room. 5. DO NOT EAT ANYTHING eight hours prior to your arrival for surgery. May have 8 ounces of water 4 hours prior to your arrival for surgery. NOTE: ALL Gastric, Bariatric and Bowel surgery patients MUST follow their surgeon's instructions. 6. MEDICATIONS    Take the following medications with a SMALL sip of water: NONE   Bariatric patient's call surgeon if on diabetic medications as some need to be stopped 1 week preop   Use your usual dose of inhalers the morning of surgery. BRING your rescue inhaler with you to hospital.    Anesthesia does NOT want you to take insulin the morning of surgery. They will control your blood sugar while you are at the hospital. Please contact your ordering physician for instructions regarding your insulin the night before your procedure. If you have an insulin pump, please keep it set on basal rate. 7. Do not swallow water when brushing teeth. No gum, candy, mints or ice chips. Refrain from smoking or at least decrease the amount. 8. Dress in loose, comfortable clothing appropriate for redressing after your procedure. Do not wear jewelry (including body piercings), make-up (especially NO eye make-up), fingernail polish (NO toenail polish if foot/leg surgery), lotion, powders or metal hairclips. 9. Dentures, glasses, or contacts will need to be removed before your procedure. Bring cases for your glasses, contacts, dentures, or hearing aids to protect them while you are in surgery. 10. If you use a CPAP, please bring it with you on the day of your procedure. 11. We recommend that valuable personal  belongings such as cash, cell phones, e-tablets or jewelry, be left at home during your stay. The hospital will not be responsible for valuables that are not secured in the hospital safe.  However, if your insurance requires a co-pay, you may want to bring a method of payment, i.e. Check or credit card, if you wish to pay your co-pay the day of surgery. 12. If you are to stay overnight, you may bring a bag with personal items. Please have any large items you may need brought in by your family after your arrival to your hospital room. 15. If you have a Living Will or Durable Power of , please bring a copy on the day of your procedure. 15. With your permission, one family member may accompany you while you are being prepared for surgery. Once you are ready, additional family members may join you. HOW WE KEEP YOU SAFE and WORK TO PREVENT SURGICAL SITE INFECTIONS:  1. Health care workers should always check your ID bracelet to verify your name and birth date. You will be asked many times to state your name, date of birth, and allergies. 2. Health care workers should always clean their hands with soap or alcohol gel before providing care to you. It is okay to ask anyone if they cleaned their hands before they touch you. 3. You will be actively involved in verifying the type of procedure you are having and ensuring the correct surgical site. This will be confirmed multiple times prior to your procedure. Do NOT omega your surgery site UNLESS instructed to by your surgeon. 4. Do not shave or wax for 72 hours prior to procedure near your operative site. Shaving with a razor can irritate your skin and make it easier to develop an infection. On the day of your procedure, any hair that needs to be removed near the surgical site will be clipped by a healthcare worker using a special clippers designed to avoid skin irritation. 5. When you are in the operating room, your surgical site will be cleansed with a special soap, and in most cases, you will be given an antibiotic before the surgery begins. What to expect AFTER YOUR PROCEDURE:  1. Immediately following your procedure, your will be taken to the PACU for the first phase of your recovery.   Your nurse will help you recover from any potential side effects of anesthesia, such as extreme drowsiness, changes in your vital signs or breathing patterns. Nausea, headache, muscle aches, or sore throat may also occur after anesthesia. Your nurse will help you manage these potential side effects. 2. For comfort and safety, arrange to have someone at home with you for the first 24 hours after discharge. 3. You and your family will be given written instructions about your diet, activity, dressing care, medications, and return visits. 4. Once at home, should issues with nausea, pain, or bleeding occur, or should you notice any signs of infection, you should call your surgeon. 5. Always clean your hands before and after caring for your wound. Do not let your family touch your surgery site without cleaning their hands. 6. Narcotic pain medications can cause significant constipation. You may want to add a stool softener to your postoperative medication schedule or speak to your surgeon on how best to manage this SIDE EFFECT. Thank you for allowing us to care for you. We strive to exceed your expectations in the delivery of care and service provided to you and your family. If you need to contact the [place] [address] staff for any reason, please call us at [phone]    Instructions reviewed with patient during preadmission testing phone interview. Harmony Martin RN.4/14/2022 .10:22 AM      ADDITIONAL EDUCATIONAL INFORMATION REVIEWED PER PHONE WITH YOU AND/OR YOUR FAMILY:    Bathing instructions per surgeon.

## 2022-04-14 NOTE — PATIENT INSTRUCTIONS
No lifting over 5 pounds, no strenuous exercise, wear bra 24/7 for 6 weeks. Protein need to get  grams per day following surgery. No driving while taking narcotics following surgery. You will have drains in place for 1 week and we will remove them in office. No vacuuming for 6 weeks.

## 2022-04-14 NOTE — PROGRESS NOTES
Breast Reduction Follow up  04/14/22     Jignesh Cartagena presents today for education and instructions for upcoming BBR. She is scheduled for 4/21/22 at [facility]. Chief Complaint   Patient presents with    Consultation     breast education        HPI     No current outpatient medications on file. No current facility-administered medications for this visit. Social History     Socioeconomic History    Marital status: Single     Spouse name: Not on file    Number of children: Not on file    Years of education: Not on file    Highest education level: Not on file   Occupational History    Not on file   Tobacco Use    Smoking status: Never Smoker    Smokeless tobacco: Never Used   Substance and Sexual Activity    Alcohol use: Yes     Alcohol/week: 2.5 standard drinks     Types: 3 Standard drinks or equivalent per week     Comment: socially    Drug use: No    Sexual activity: Never   Other Topics Concern    Not on file   Social History Narrative    Not on file     Social Determinants of Health     Financial Resource Strain: Low Risk     Difficulty of Paying Living Expenses: Not very hard   Food Insecurity: No Food Insecurity    Worried About Running Out of Food in the Last Year: Never true    Reagan of Food in the Last Year: Never true   Transportation Needs:     Lack of Transportation (Medical): Not on file    Lack of Transportation (Non-Medical):  Not on file   Physical Activity:     Days of Exercise per Week: Not on file    Minutes of Exercise per Session: Not on file   Stress:     Feeling of Stress : Not on file   Social Connections:     Frequency of Communication with Friends and Family: Not on file    Frequency of Social Gatherings with Friends and Family: Not on file    Attends Advent Services: Not on file    Active Member of Clubs or Organizations: Not on file    Attends Club or Organization Meetings: Not on file    Marital Status: Not on file   Intimate Partner Violence:     Fear of Current or Ex-Partner: Not on file    Emotionally Abused: Not on file    Physically Abused: Not on file    Sexually Abused: Not on file   Housing Stability:     Unable to Pay for Housing in the Last Year: Not on file    Number of Places Lived in the Last Year: Not on file    Unstable Housing in the Last Year: Not on file        Past Medical History:   Diagnosis Date    Acne     Chlamydia infection 5/13    Dysmenorrhea     Fibrocystic breast disease     Fracture of radius, left, closed 2000    Hammertoe     Bilateral 5th digits    Herpes simplex     Iron deficiency     Pityriasis rosea         Past Surgical History:   Procedure Laterality Date    OTHER SURGICAL HISTORY  7/28/2014    HAMMERTOE DEFORMITY REPAIR BILATERAL 5TH DIGITS    UMBILICAL HERNIA REPAIR  1991        /76   Pulse 66   Temp 98.7 °F (37.1 °C)   Wt 206 lb (93.4 kg)   LMP 04/13/2022 (Exact Date)   SpO2 96%   BMI 34.28 kg/m²      Physical Exam  Vitals reviewed. Constitutional:       Appearance: Normal appearance. HENT:      Head: Normocephalic. Eyes:      Extraocular Movements: Extraocular movements intact. Pupils: Pupils are equal, round, and reactive to light. Cardiovascular:      Rate and Rhythm: Normal rate and regular rhythm. Pulses: Normal pulses. Pulmonary:      Effort: Pulmonary effort is normal.      Breath sounds: Normal breath sounds. Abdominal:      General: Bowel sounds are normal.      Palpations: Abdomen is soft. Musculoskeletal:      Cervical back: Normal range of motion. Neurological:      Mental Status: She is alert. PLAN: Went over all education with patient and restrictions. Answered all questions and patient feels she is educated about upcoming surgery. We will see her 1 week post op in office.      DAVID Concepcion - ADAN

## 2022-04-20 ENCOUNTER — ANESTHESIA EVENT (OUTPATIENT)
Dept: OPERATING ROOM | Age: 32
End: 2022-04-20
Payer: MEDICAID

## 2022-04-21 ENCOUNTER — ANESTHESIA (OUTPATIENT)
Dept: OPERATING ROOM | Age: 32
End: 2022-04-21
Payer: MEDICAID

## 2022-04-21 ENCOUNTER — HOSPITAL ENCOUNTER (OUTPATIENT)
Age: 32
Setting detail: OUTPATIENT SURGERY
Discharge: HOME | End: 2022-04-21
Attending: SURGERY | Admitting: SURGERY
Payer: MEDICAID

## 2022-04-21 VITALS — SYSTOLIC BLOOD PRESSURE: 119 MMHG | OXYGEN SATURATION: 100 % | DIASTOLIC BLOOD PRESSURE: 66 MMHG | TEMPERATURE: 97.5 F

## 2022-04-21 VITALS
RESPIRATION RATE: 16 BRPM | WEIGHT: 206 LBS | SYSTOLIC BLOOD PRESSURE: 138 MMHG | BODY MASS INDEX: 34.32 KG/M2 | TEMPERATURE: 97.5 F | OXYGEN SATURATION: 97 % | DIASTOLIC BLOOD PRESSURE: 88 MMHG | HEIGHT: 65 IN | HEART RATE: 72 BPM

## 2022-04-21 DIAGNOSIS — N62 MACROMASTIA: ICD-10-CM

## 2022-04-21 DIAGNOSIS — G89.18 POST-OP PAIN: Primary | ICD-10-CM

## 2022-04-21 LAB
EKG ATRIAL RATE: 62 BPM
EKG DIAGNOSIS: NORMAL
EKG P AXIS: 38 DEGREES
EKG P-R INTERVAL: 166 MS
EKG Q-T INTERVAL: 434 MS
EKG QRS DURATION: 78 MS
EKG QTC CALCULATION (BAZETT): 440 MS
EKG R AXIS: 22 DEGREES
EKG T AXIS: 14 DEGREES
EKG VENTRICULAR RATE: 62 BPM
PREGNANCY, URINE: NEGATIVE

## 2022-04-21 PROCEDURE — 7100000000 HC PACU RECOVERY - FIRST 15 MIN: Performed by: SURGERY

## 2022-04-21 PROCEDURE — 19318 BREAST REDUCTION: CPT | Performed by: SURGERY

## 2022-04-21 PROCEDURE — 6360000002 HC RX W HCPCS: Performed by: ANESTHESIOLOGY

## 2022-04-21 PROCEDURE — 7100000010 HC PHASE II RECOVERY - FIRST 15 MIN: Performed by: SURGERY

## 2022-04-21 PROCEDURE — 7100000011 HC PHASE II RECOVERY - ADDTL 15 MIN: Performed by: SURGERY

## 2022-04-21 PROCEDURE — 3700000000 HC ANESTHESIA ATTENDED CARE: Performed by: SURGERY

## 2022-04-21 PROCEDURE — 3600000014 HC SURGERY LEVEL 4 ADDTL 15MIN: Performed by: SURGERY

## 2022-04-21 PROCEDURE — 84703 CHORIONIC GONADOTROPIN ASSAY: CPT

## 2022-04-21 PROCEDURE — 2500000003 HC RX 250 WO HCPCS: Performed by: NURSE ANESTHETIST, CERTIFIED REGISTERED

## 2022-04-21 PROCEDURE — 6370000000 HC RX 637 (ALT 250 FOR IP)

## 2022-04-21 PROCEDURE — 3600000004 HC SURGERY LEVEL 4 BASE: Performed by: SURGERY

## 2022-04-21 PROCEDURE — 2720000010 HC SURG SUPPLY STERILE: Performed by: SURGERY

## 2022-04-21 PROCEDURE — 6360000002 HC RX W HCPCS: Performed by: SURGERY

## 2022-04-21 PROCEDURE — 7100000001 HC PACU RECOVERY - ADDTL 15 MIN: Performed by: SURGERY

## 2022-04-21 PROCEDURE — 3700000001 HC ADD 15 MINUTES (ANESTHESIA): Performed by: SURGERY

## 2022-04-21 PROCEDURE — 2500000003 HC RX 250 WO HCPCS: Performed by: SURGERY

## 2022-04-21 PROCEDURE — 88305 TISSUE EXAM BY PATHOLOGIST: CPT

## 2022-04-21 PROCEDURE — 6360000002 HC RX W HCPCS: Performed by: NURSE ANESTHETIST, CERTIFIED REGISTERED

## 2022-04-21 PROCEDURE — 2709999900 HC NON-CHARGEABLE SUPPLY: Performed by: SURGERY

## 2022-04-21 PROCEDURE — 2580000003 HC RX 258: Performed by: ANESTHESIOLOGY

## 2022-04-21 PROCEDURE — 2580000003 HC RX 258: Performed by: SURGERY

## 2022-04-21 PROCEDURE — C1729 CATH, DRAINAGE: HCPCS | Performed by: SURGERY

## 2022-04-21 RX ORDER — SODIUM CHLORIDE 0.9 % (FLUSH) 0.9 %
5-40 SYRINGE (ML) INJECTION EVERY 12 HOURS SCHEDULED
Status: DISCONTINUED | OUTPATIENT
Start: 2022-04-21 | End: 2022-04-21 | Stop reason: HOSPADM

## 2022-04-21 RX ORDER — ROCURONIUM BROMIDE 10 MG/ML
INJECTION, SOLUTION INTRAVENOUS PRN
Status: DISCONTINUED | OUTPATIENT
Start: 2022-04-21 | End: 2022-04-21 | Stop reason: SDUPTHER

## 2022-04-21 RX ORDER — CEPHALEXIN 500 MG/1
500 CAPSULE ORAL 4 TIMES DAILY
Qty: 40 CAPSULE | Refills: 0 | Status: SHIPPED | OUTPATIENT
Start: 2022-04-21 | End: 2022-05-01

## 2022-04-21 RX ORDER — SODIUM CHLORIDE 9 MG/ML
25 INJECTION, SOLUTION INTRAVENOUS PRN
Status: DISCONTINUED | OUTPATIENT
Start: 2022-04-21 | End: 2022-04-21 | Stop reason: HOSPADM

## 2022-04-21 RX ORDER — SODIUM CHLORIDE, SODIUM LACTATE, POTASSIUM CHLORIDE, CALCIUM CHLORIDE 600; 310; 30; 20 MG/100ML; MG/100ML; MG/100ML; MG/100ML
INJECTION, SOLUTION INTRAVENOUS CONTINUOUS
Status: DISCONTINUED | OUTPATIENT
Start: 2022-04-21 | End: 2022-04-21 | Stop reason: HOSPADM

## 2022-04-21 RX ORDER — OXYCODONE HYDROCHLORIDE AND ACETAMINOPHEN 5; 325 MG/1; MG/1
1 TABLET ORAL
Status: COMPLETED | OUTPATIENT
Start: 2022-04-21 | End: 2022-04-21

## 2022-04-21 RX ORDER — MIDAZOLAM HYDROCHLORIDE 1 MG/ML
INJECTION INTRAMUSCULAR; INTRAVENOUS PRN
Status: DISCONTINUED | OUTPATIENT
Start: 2022-04-21 | End: 2022-04-21 | Stop reason: SDUPTHER

## 2022-04-21 RX ORDER — METOCLOPRAMIDE HYDROCHLORIDE 5 MG/ML
INJECTION INTRAMUSCULAR; INTRAVENOUS PRN
Status: DISCONTINUED | OUTPATIENT
Start: 2022-04-21 | End: 2022-04-21 | Stop reason: SDUPTHER

## 2022-04-21 RX ORDER — ONDANSETRON 2 MG/ML
INJECTION INTRAMUSCULAR; INTRAVENOUS PRN
Status: DISCONTINUED | OUTPATIENT
Start: 2022-04-21 | End: 2022-04-21 | Stop reason: SDUPTHER

## 2022-04-21 RX ORDER — PROPOFOL 10 MG/ML
INJECTION, EMULSION INTRAVENOUS PRN
Status: DISCONTINUED | OUTPATIENT
Start: 2022-04-21 | End: 2022-04-21 | Stop reason: SDUPTHER

## 2022-04-21 RX ORDER — HYDRALAZINE HYDROCHLORIDE 20 MG/ML
INJECTION INTRAMUSCULAR; INTRAVENOUS PRN
Status: DISCONTINUED | OUTPATIENT
Start: 2022-04-21 | End: 2022-04-21 | Stop reason: SDUPTHER

## 2022-04-21 RX ORDER — OXYCODONE HYDROCHLORIDE AND ACETAMINOPHEN 5; 325 MG/1; MG/1
1 TABLET ORAL EVERY 6 HOURS PRN
Qty: 28 TABLET | Refills: 0 | Status: SHIPPED | OUTPATIENT
Start: 2022-04-21 | End: 2022-04-28

## 2022-04-21 RX ORDER — SODIUM CHLORIDE 0.9 % (FLUSH) 0.9 %
5-40 SYRINGE (ML) INJECTION PRN
Status: DISCONTINUED | OUTPATIENT
Start: 2022-04-21 | End: 2022-04-21 | Stop reason: HOSPADM

## 2022-04-21 RX ORDER — HYDROMORPHONE HCL 110MG/55ML
PATIENT CONTROLLED ANALGESIA SYRINGE INTRAVENOUS PRN
Status: DISCONTINUED | OUTPATIENT
Start: 2022-04-21 | End: 2022-04-21 | Stop reason: SDUPTHER

## 2022-04-21 RX ORDER — FENTANYL CITRATE 50 UG/ML
INJECTION, SOLUTION INTRAMUSCULAR; INTRAVENOUS PRN
Status: DISCONTINUED | OUTPATIENT
Start: 2022-04-21 | End: 2022-04-21 | Stop reason: SDUPTHER

## 2022-04-21 RX ORDER — HYDRALAZINE HYDROCHLORIDE 20 MG/ML
10 INJECTION INTRAMUSCULAR; INTRAVENOUS
Status: DISCONTINUED | OUTPATIENT
Start: 2022-04-21 | End: 2022-04-21 | Stop reason: HOSPADM

## 2022-04-21 RX ORDER — LIDOCAINE HYDROCHLORIDE 20 MG/ML
INJECTION, SOLUTION INFILTRATION; PERINEURAL PRN
Status: DISCONTINUED | OUTPATIENT
Start: 2022-04-21 | End: 2022-04-21 | Stop reason: SDUPTHER

## 2022-04-21 RX ORDER — FAMOTIDINE 10 MG/ML
INJECTION, SOLUTION INTRAVENOUS PRN
Status: DISCONTINUED | OUTPATIENT
Start: 2022-04-21 | End: 2022-04-21 | Stop reason: SDUPTHER

## 2022-04-21 RX ORDER — MEPERIDINE HYDROCHLORIDE 25 MG/ML
12.5 INJECTION INTRAMUSCULAR; INTRAVENOUS; SUBCUTANEOUS EVERY 5 MIN PRN
Status: DISCONTINUED | OUTPATIENT
Start: 2022-04-21 | End: 2022-04-21 | Stop reason: HOSPADM

## 2022-04-21 RX ADMIN — ROCURONIUM BROMIDE 20 MG: 10 INJECTION INTRAVENOUS at 14:28

## 2022-04-21 RX ADMIN — HYDRALAZINE HYDROCHLORIDE 6 MG: 20 INJECTION INTRAMUSCULAR; INTRAVENOUS at 14:31

## 2022-04-21 RX ADMIN — PROPOFOL 200 MG: 10 INJECTION, EMULSION INTRAVENOUS at 12:27

## 2022-04-21 RX ADMIN — SUGAMMADEX 200 MG: 100 INJECTION, SOLUTION INTRAVENOUS at 15:02

## 2022-04-21 RX ADMIN — SODIUM CHLORIDE, POTASSIUM CHLORIDE, SODIUM LACTATE AND CALCIUM CHLORIDE: 600; 310; 30; 20 INJECTION, SOLUTION INTRAVENOUS at 13:10

## 2022-04-21 RX ADMIN — MIDAZOLAM HYDROCHLORIDE 2 MG: 2 INJECTION, SOLUTION INTRAMUSCULAR; INTRAVENOUS at 12:22

## 2022-04-21 RX ADMIN — SODIUM CHLORIDE, POTASSIUM CHLORIDE, SODIUM LACTATE AND CALCIUM CHLORIDE: 600; 310; 30; 20 INJECTION, SOLUTION INTRAVENOUS at 11:32

## 2022-04-21 RX ADMIN — HYDROMORPHONE HYDROCHLORIDE 2 MG: 2 INJECTION, SOLUTION INTRAMUSCULAR; INTRAVENOUS; SUBCUTANEOUS at 12:27

## 2022-04-21 RX ADMIN — HYDROMORPHONE HYDROCHLORIDE 0.5 MG: 1 INJECTION, SOLUTION INTRAMUSCULAR; INTRAVENOUS; SUBCUTANEOUS at 15:41

## 2022-04-21 RX ADMIN — FAMOTIDINE 20 MG: 10 INJECTION, SOLUTION INTRAVENOUS at 12:24

## 2022-04-21 RX ADMIN — HYDROMORPHONE HYDROCHLORIDE 0.25 MG: 1 INJECTION, SOLUTION INTRAMUSCULAR; INTRAVENOUS; SUBCUTANEOUS at 16:08

## 2022-04-21 RX ADMIN — ROCURONIUM BROMIDE 100 MG: 10 INJECTION INTRAVENOUS at 12:28

## 2022-04-21 RX ADMIN — FENTANYL CITRATE 100 MCG: 50 INJECTION, SOLUTION INTRAMUSCULAR; INTRAVENOUS at 15:19

## 2022-04-21 RX ADMIN — PROPOFOL 50 MG: 10 INJECTION, EMULSION INTRAVENOUS at 14:59

## 2022-04-21 RX ADMIN — METOCLOPRAMIDE 10 MG: 5 INJECTION, SOLUTION INTRAMUSCULAR; INTRAVENOUS at 12:28

## 2022-04-21 RX ADMIN — PROPOFOL 50 MG: 10 INJECTION, EMULSION INTRAVENOUS at 15:00

## 2022-04-21 RX ADMIN — HYDROMORPHONE HYDROCHLORIDE 0.25 MG: 1 INJECTION, SOLUTION INTRAMUSCULAR; INTRAVENOUS; SUBCUTANEOUS at 16:03

## 2022-04-21 RX ADMIN — LIDOCAINE HYDROCHLORIDE 100 MG: 20 INJECTION, SOLUTION INFILTRATION; PERINEURAL at 12:27

## 2022-04-21 RX ADMIN — CEFAZOLIN SODIUM 2000 MG: 10 INJECTION, POWDER, FOR SOLUTION INTRAVENOUS at 12:40

## 2022-04-21 RX ADMIN — TRANEXAMIC ACID 1000 MG: 100 INJECTION INTRAVENOUS at 12:50

## 2022-04-21 RX ADMIN — OXYCODONE HYDROCHLORIDE AND ACETAMINOPHEN 1 TABLET: 5; 325 TABLET ORAL at 16:11

## 2022-04-21 RX ADMIN — ONDANSETRON 8 MG: 2 INJECTION INTRAMUSCULAR; INTRAVENOUS at 12:25

## 2022-04-21 ASSESSMENT — PULMONARY FUNCTION TESTS
PIF_VALUE: 22
PIF_VALUE: 23
PIF_VALUE: 22
PIF_VALUE: 24
PIF_VALUE: 22
PIF_VALUE: 1
PIF_VALUE: 21
PIF_VALUE: 23
PIF_VALUE: 21
PIF_VALUE: 22
PIF_VALUE: 23
PIF_VALUE: 23
PIF_VALUE: 21
PIF_VALUE: 23
PIF_VALUE: 22
PIF_VALUE: 23
PIF_VALUE: 21
PIF_VALUE: 23
PIF_VALUE: 21
PIF_VALUE: 22
PIF_VALUE: 22
PIF_VALUE: 24
PIF_VALUE: 5
PIF_VALUE: 18
PIF_VALUE: 1
PIF_VALUE: 18
PIF_VALUE: 21
PIF_VALUE: 22
PIF_VALUE: 23
PIF_VALUE: 22
PIF_VALUE: 18
PIF_VALUE: 1
PIF_VALUE: 22
PIF_VALUE: 22
PIF_VALUE: 21
PIF_VALUE: 21
PIF_VALUE: 22
PIF_VALUE: 22
PIF_VALUE: 18
PIF_VALUE: 23
PIF_VALUE: 23
PIF_VALUE: 22
PIF_VALUE: 23
PIF_VALUE: 21
PIF_VALUE: 19
PIF_VALUE: 24
PIF_VALUE: 20
PIF_VALUE: 33
PIF_VALUE: 21
PIF_VALUE: 24
PIF_VALUE: 23
PIF_VALUE: 22
PIF_VALUE: 23
PIF_VALUE: 22
PIF_VALUE: 24
PIF_VALUE: 22
PIF_VALUE: 18
PIF_VALUE: 23
PIF_VALUE: 19
PIF_VALUE: 22
PIF_VALUE: 28
PIF_VALUE: 22
PIF_VALUE: 22
PIF_VALUE: 23
PIF_VALUE: 21
PIF_VALUE: 22
PIF_VALUE: 22
PIF_VALUE: 23
PIF_VALUE: 22
PIF_VALUE: 22
PIF_VALUE: 23
PIF_VALUE: 21
PIF_VALUE: 22
PIF_VALUE: 23
PIF_VALUE: 22
PIF_VALUE: 23
PIF_VALUE: 23
PIF_VALUE: 22
PIF_VALUE: 23
PIF_VALUE: 22
PIF_VALUE: 22
PIF_VALUE: 24
PIF_VALUE: 23
PIF_VALUE: 22
PIF_VALUE: 22
PIF_VALUE: 23
PIF_VALUE: 23
PIF_VALUE: 21
PIF_VALUE: 22
PIF_VALUE: 22
PIF_VALUE: 23
PIF_VALUE: 19
PIF_VALUE: 22
PIF_VALUE: 22
PIF_VALUE: 24
PIF_VALUE: 22
PIF_VALUE: 22
PIF_VALUE: 23
PIF_VALUE: 18
PIF_VALUE: 23
PIF_VALUE: 1
PIF_VALUE: 23
PIF_VALUE: 23
PIF_VALUE: 22
PIF_VALUE: 23
PIF_VALUE: 18
PIF_VALUE: 22
PIF_VALUE: 23
PIF_VALUE: 21
PIF_VALUE: 22
PIF_VALUE: 23
PIF_VALUE: 1
PIF_VALUE: 22
PIF_VALUE: 23
PIF_VALUE: 22
PIF_VALUE: 19
PIF_VALUE: 22
PIF_VALUE: 23
PIF_VALUE: 0
PIF_VALUE: 22
PIF_VALUE: 22
PIF_VALUE: 23
PIF_VALUE: 22
PIF_VALUE: 23
PIF_VALUE: 21
PIF_VALUE: 23
PIF_VALUE: 22
PIF_VALUE: 22
PIF_VALUE: 18
PIF_VALUE: 22
PIF_VALUE: 23
PIF_VALUE: 21
PIF_VALUE: 23
PIF_VALUE: 18
PIF_VALUE: 1
PIF_VALUE: 22
PIF_VALUE: 2
PIF_VALUE: 23
PIF_VALUE: 18
PIF_VALUE: 6
PIF_VALUE: 21
PIF_VALUE: 23
PIF_VALUE: 23
PIF_VALUE: 22
PIF_VALUE: 23
PIF_VALUE: 1
PIF_VALUE: 22
PIF_VALUE: 22
PIF_VALUE: 23
PIF_VALUE: 22
PIF_VALUE: 22
PIF_VALUE: 23
PIF_VALUE: 22
PIF_VALUE: 21
PIF_VALUE: 18
PIF_VALUE: 22
PIF_VALUE: 19
PIF_VALUE: 24
PIF_VALUE: 22
PIF_VALUE: 23
PIF_VALUE: 23
PIF_VALUE: 22
PIF_VALUE: 1
PIF_VALUE: 21
PIF_VALUE: 23
PIF_VALUE: 19
PIF_VALUE: 22
PIF_VALUE: 22

## 2022-04-21 ASSESSMENT — PAIN DESCRIPTION - ORIENTATION
ORIENTATION: RIGHT;LEFT

## 2022-04-21 ASSESSMENT — PAIN DESCRIPTION - DESCRIPTORS
DESCRIPTORS: ACHING;BURNING
DESCRIPTORS: BURNING
DESCRIPTORS: ACHING;BURNING

## 2022-04-21 ASSESSMENT — PAIN DESCRIPTION - LOCATION
LOCATION: BREAST

## 2022-04-21 ASSESSMENT — PAIN DESCRIPTION - PAIN TYPE
TYPE: SURGICAL PAIN
TYPE: SURGICAL PAIN

## 2022-04-21 ASSESSMENT — PAIN SCALES - GENERAL
PAINLEVEL_OUTOF10: 7
PAINLEVEL_OUTOF10: 0
PAINLEVEL_OUTOF10: 5
PAINLEVEL_OUTOF10: 5
PAINLEVEL_OUTOF10: 4
PAINLEVEL_OUTOF10: 5

## 2022-04-21 ASSESSMENT — PAIN DESCRIPTION - FREQUENCY
FREQUENCY: CONTINUOUS
FREQUENCY: CONTINUOUS

## 2022-04-21 ASSESSMENT — PAIN - FUNCTIONAL ASSESSMENT: PAIN_FUNCTIONAL_ASSESSMENT: NONE - DENIES PAIN

## 2022-04-21 ASSESSMENT — PAIN DESCRIPTION - ONSET
ONSET: ON-GOING
ONSET: ON-GOING

## 2022-04-21 NOTE — ANESTHESIA POSTPROCEDURE EVALUATION
Department of Anesthesiology  Postprocedure Note    Patient: Manuel Aguiar  MRN: [id number]  YOB: 1990  Date of evaluation: 4/21/2022  Time:  5:51 PM     Procedure Summary     Date: 04/21/22 Room / Location: [facility] [address] [place] [address] / [facility]    Anesthesia Start: 3454 Anesthesia Stop: 0877    Procedure: BILATERAL BREAST REDUCTION (Bilateral ) Diagnosis:       Macromastia      (Macromastia Cindy Guillen)    Surgeons: Cheryl Dang MD Responsible Provider: Janet Qureshi DO    Anesthesia Type: general ASA Status: 2          Anesthesia Type: general    Sunil Phase I: Sunil Score: 10    Sunil Phase II: Sunil Score: 10    Last vitals: Reviewed and per EMR flowsheets.        Anesthesia Post Evaluation    Patient location during evaluation: PACU  Patient participation: complete - patient participated  Level of consciousness: awake and alert  Pain score: 4  Airway patency: patent  Nausea & Vomiting: no nausea and no vomiting  Cardiovascular status: blood pressure returned to baseline  Respiratory status: acceptable  Hydration status: euvolemic

## 2022-04-21 NOTE — PROGRESS NOTES
Ambulatory Surgery/Procedure Discharge Note    Vitals:    04/21/22 1624   BP: 138/88   Pulse: 72   Resp: 16   Temp: 97.5 °F (36.4 °C)   SpO2: 97%       In: 1600 [I.V.:1600]  Out: 390 [Urine:250; Drains:40]    Restroom use offered before discharge. yes    Pain assessment:  Pain level tolerable for discharge per patient. Pain Level: 4    Patient did have a episode of vomiting patient stated after \"I feel a lot better and would just like to go home\"    Discussed at length discharge instructions with patient and patients father. Questions answered appropriately and patient and father verbalized understanding. Patient discharged to home/self care.  Patient discharged via wheel chair by transporter to waiting family/S.O.       4/21/2022 5:28 PM

## 2022-04-21 NOTE — OP NOTE
[place] [address] SURGERY     OPERATIVE DICTATION    NAME: Emeka Joaquin   MRN: [id number]  DATE: 4/21/2022    AGE: 34 y.o. PREOPERATIVE DIAGNOSIS:  Macromastia. POSTOPERATIVE DIAGNOSIS:  Macromastia. OPERATION PERFORMED:  Bilateral breast reduction. SURGEON:  Felix Devine MD     ASSISTANT:  Beryl Galvez (PGY1), Maggie Blanco (SA). ANESTHESIA:  General.     ESTIMATED BLOOD LOSS:  50 mL. DRAINS:  2 (1 each breast). SPECIMENS:  Breast tissue (right breast 1220.2 gm, left breast 1298.8 gm). OPERATIVE INDICATIONS:  This is an 34y.o.-year-old female with a history of  symptomatic macromastia. She desired breast reduction to alleviate her  symptoms. The risks, benefits, alternatives, outcomes, and  personnel involved with the aforementioned procedure were discussed in  detail with the patient. After all questions were answered in a  satisfactory manner, she agreed to proceed. OPERATIVE PROCEDURE:  The patient was marked in the preoperative holding  area and then brought to the operating room, and placed in supine position  on the operating room table. After satisfactory induction of general  endotracheal anesthesia, the patient was then prepped and draped in the  usual sterile manner. Time-out was performed confirming the patient and  the procedure to be performed. The operation commenced via  de-epithelializing along the inferior aspect of the breast to create an  inferior pedicle. A Marcelo pattern was then scored using the previous  markings. Breast tissue was removed from thelateral, medial, superior, and posterior aspects of the breast pedicle. The same procedure was then  performed on the contralateral left side removing the same tissue. The patient was then tailor-tacked, sat up to check for symmetry. Once symmetry was assessed to  be satisfactory, the patient was then sat back down. The breast opened up.   The wounds were both copiously irrigated and hemostasis with  electrocautery, clips, and ties. Once it was satisfactory, the wound was  then again tailor-tacked and then closed in layers using 3-0 Monocryl and Stratafix sutures. The nipples were then brought through the superior aspect of the vertical incision after being sat up to ensure appropriate positioning. The nipple was then sutured in place using 3-0 Monocryl suture. A sterile dressing was then applied. The patient was then awakened and taken to PACU in satisfactory condition. There were no  immediate complications. The patient tolerated the procedure well. At the  end of the case, all counts were correct.      Olivier Luo MD

## 2022-04-21 NOTE — PROGRESS NOTES
PACU Transfer to [facility]    Vitals:    04/21/22 1600   BP: (!) 142/93   Pulse: 73   Resp: 17   Temp: 97.5 °F (36.4 °C)   SpO2: 97%         Intake/Output Summary (Last 24 hours) at 4/21/2022 1620  Last data filed at 4/21/2022 1502  Gross per 24 hour   Intake 1600 ml   Output 350 ml   Net 1250 ml       Pain assessment:  level of pain (1-10, 10 severe),   Pain Level: 5    Patient transferred to care of [facility] RN.    4/21/2022 4:20 PM

## 2022-04-21 NOTE — ANESTHESIA PRE PROCEDURE
kg/m².    CBC:   Lab Results   Component Value Date    WBC 3.0 03/29/2022    RBC 4.42 03/29/2022    HGB 12.5 03/29/2022    HCT 38.4 03/29/2022    MCV 87.0 03/29/2022    RDW 14.5 03/29/2022     03/29/2022       CMP:   Lab Results   Component Value Date     03/29/2022    K 4.4 03/29/2022     03/29/2022    CO2 22 03/29/2022    BUN 10 03/29/2022    CREATININE 0.7 03/29/2022    GFRAA >60 03/29/2022    AGRATIO 1.9 03/29/2022    LABGLOM >60 03/29/2022    GLUCOSE 89 03/29/2022    PROT 6.9 03/29/2022    CALCIUM 9.1 03/29/2022    BILITOT <0.2 03/29/2022    ALKPHOS 61 03/29/2022    AST 15 03/29/2022    ALT 15 03/29/2022       POC Tests: No results for input(s): POCGLU, POCNA, POCK, POCCL, POCBUN, POCHEMO, POCHCT in the last 72 hours.     Coags:   Lab Results   Component Value Date    PROTIME 11.7 03/29/2022    INR 1.03 03/29/2022    APTT 31.1 03/29/2022       HCG (If Applicable):   Lab Results   Component Value Date    PREGTESTUR Negative 07/28/2014        ABGs: No results found for: PHART, PO2ART, MZM6WBM, KBJ8UFX, BEART, W1BUTSHT     Type & Screen (If Applicable):  No results found for: LABABO, LABRH    Drug/Infectious Status (If Applicable):  Lab Results   Component Value Date    HEPCAB Negative 02/11/2013       COVID-19 Screening (If Applicable): No results found for: COVID19        Anesthesia Evaluation  Patient summary reviewed and Nursing notes reviewed no history of anesthetic complications:   Airway: Mallampati: III  TM distance: >3 FB   Neck ROM: full  Mouth opening: > = 3 FB Dental: normal exam         Pulmonary:Negative Pulmonary ROS and normal exam                               Cardiovascular:  Exercise tolerance: good (>4 METS),       (-)  angina, orthopnea and PND    ECG reviewed  Rhythm: regular  Rate: normal           Beta Blocker:  Not on Beta Blocker         Neuro/Psych:   Negative Neuro/Psych ROS              GI/Hepatic/Renal: Neg GI/Hepatic/Renal ROS            Endo/Other: Negative Endo/Other ROS                    Abdominal:   (+) obese,     Abdomen: soft. Vascular: negative vascular ROS. Other Findings:             Anesthesia Plan      general     ASA 2       Induction: intravenous. MIPS: Postoperative opioids intended and Prophylactic antiemetics administered. Anesthetic plan and risks discussed with patient. Plan discussed with CRNA and attending.                   Smooth Hurtado DO   4/21/2022

## 2022-04-21 NOTE — H&P
Magali Villalobos    [id number]    [facility] Same Day Surgery Update H & P  Department of General Surgery   Surgical Service   Pre-operative History and Physical  Last H & P within the last 30 days. DIAGNOSIS:   Macromastia [N62]    Procedure(s):  BILATERAL BREAST REDUCTION    History obtained from: Patient interview and EHR      HISTORY OF PRESENT ILLNESS:   Patient is a 34 y.o. female with c/o neck/upper back pain and intertrigo in the setting of macromastia, and presents today for bilateral breast reduction. Illness screening:  Patient denies recent fever, chills, worsening cough, or known sick exposure     Past Medical History:        Diagnosis Date    Acne     Chlamydia infection 5/13    Dysmenorrhea     Fibrocystic breast disease     Fracture of radius, left, closed 2000    Hammertoe     Bilateral 5th digits    Herpes simplex     Iron deficiency     Pityriasis rosea      Past Surgical History:        Procedure Laterality Date    OTHER SURGICAL HISTORY  7/28/2014    HAMMERTOE DEFORMITY REPAIR BILATERAL 5TH DIGITS    UMBILICAL HERNIA REPAIR  1991       Medications Prior to Admission:      None. Allergies:  Patient has no known allergies. PHYSICAL EXAM:      BP (!) 118/91   Pulse 65   Temp 98 °F (36.7 °C) (Temporal)   Resp 14   Ht 5' 5\" (1.651 m)   Wt 206 lb (93.4 kg)   LMP 04/13/2022 (Exact Date)   SpO2 98%   BMI 34.28 kg/m²      Airway:  Airway patent with no audible stridor. Heart:  Regular rate and rhythm. No murmur noted. Lungs:  No increased work of breathing, good air exchange, clear to auscultation bilaterally, no crackles or wheezing. Abdomen:  Soft, non-distended, non-tender, no rebound tenderness or guarding, and no masses palpated. ASSESSMENT AND PLAN     Patient is a 34 y.o. female with above specified procedure planned. 1.  The patients history and physical was obtained and signed off by the pre-admission testing department.   Patient seen and focused exam done today- no new changes since last physical exam on 3/29/2022.    2.  Access to ancillary services are available per request of the provider.     DAVID Barnes - CNP     4/21/2022

## 2022-04-22 ENCOUNTER — TELEPHONE (OUTPATIENT)
Dept: SURGERY | Age: 32
End: 2022-04-22

## 2022-04-22 NOTE — TELEPHONE ENCOUNTER
I spoke with patient this AM.  Patient  stated they are doing well after surgery. Patient stated that pain is manageable and their intake is adequate. Postoperative instructions reinforced. All questions answered. Patient confirmed that they would call with any issues or problems. Patient has follow up appointment on 4/28/2022.     NICHOLAS Coyne, RN [facility]  [phone]

## 2022-04-27 NOTE — PATIENT INSTRUCTIONS
No lifting over 5 pounds  No vacuuming  No pushing or pulling  No strenuous activity  Wear surgical bra or sports bra 24/7   Need to obtain  grams of protein per day  All restrictions for 6 weeks  No driving while taking narcotics

## 2022-04-27 NOTE — PROGRESS NOTES
[facility]      PROCEDURE: BBR  DATE: 4/21/22    Jossy Reynoso has been recovering well. Pain has been well controlled with prescribed pain medication. EXAM    /77   Pulse 73   Temp 98 °F (36.7 °C)   Wt 206 lb (93.4 kg)   LMP 04/13/2022 (Exact Date)   SpO2 97%   BMI 34.28 kg/m²      GEN: NAD  BREAST: Incisions healing well. Good contour. Nipples viable. Drains serosang. PATHOLOGY: FINAL DIAGNOSIS:        A. Right breast tissue, reduction (1236 g):        -Benign skin and underlying breast parenchyma with stromal fibrosis        and fibroadenoma.        B. Left breast tissue, reduction (1324 g):        -Benign skin and underlying breast parenchyma with stromal fibrosis        and fibroadenomas    IMP: 34 y. o.female s/p BBR  PLAN: Doing well. Patient is happy thus far. Bilateral drains removed. Will return in 1 month to ensure wound healing. Will call with any concerns in the interim.          Roma Butts, APRN - [address] [facility] Reconstruct[facility] Surgery  [phone]  04/28/22

## 2022-04-28 ENCOUNTER — OFFICE VISIT (OUTPATIENT)
Dept: SURGERY | Age: 32
End: 2022-04-28

## 2022-04-28 VITALS
WEIGHT: 206 LBS | OXYGEN SATURATION: 97 % | SYSTOLIC BLOOD PRESSURE: 107 MMHG | TEMPERATURE: 98 F | DIASTOLIC BLOOD PRESSURE: 77 MMHG | HEART RATE: 73 BPM | BODY MASS INDEX: 34.28 KG/M2

## 2022-04-28 DIAGNOSIS — Z09 POSTOP CHECK: Primary | ICD-10-CM

## 2022-04-28 PROCEDURE — 99024 POSTOP FOLLOW-UP VISIT: CPT

## 2022-05-16 ENCOUNTER — TELEPHONE (OUTPATIENT)
Dept: SURGERY | Age: 32
End: 2022-05-16

## 2022-05-16 NOTE — TELEPHONE ENCOUNTER
Patient called needing to talk to [facility] NP about some questions that have come up about healing and any restrictions she may have.  She would like to know if she is able to sleep on her side or stomach at this point    Please call: [phone]

## 2022-05-26 ENCOUNTER — OFFICE VISIT (OUTPATIENT)
Dept: SURGERY | Age: 32
End: 2022-05-26

## 2022-05-26 VITALS
SYSTOLIC BLOOD PRESSURE: 117 MMHG | BODY MASS INDEX: 33.12 KG/M2 | DIASTOLIC BLOOD PRESSURE: 74 MMHG | TEMPERATURE: 98.7 F | WEIGHT: 199 LBS | OXYGEN SATURATION: 98 % | HEART RATE: 78 BPM

## 2022-05-26 DIAGNOSIS — Z09 POSTOP CHECK: Primary | ICD-10-CM

## 2022-05-26 PROCEDURE — 99024 POSTOP FOLLOW-UP VISIT: CPT

## 2022-07-07 ENCOUNTER — TELEPHONE (OUTPATIENT)
Dept: FAMILY MEDICINE CLINIC | Age: 32
End: 2022-07-07

## 2022-07-07 NOTE — TELEPHONE ENCOUNTER
Patient is requesting to be seen - sounds like an urgent appointment but no appointments available for this week. Is there any way we can squeeze in this week somewhere or should I advise to urgent care or little clinic and follow up with us if still no better? Thank you!

## 2022-07-07 NOTE — TELEPHONE ENCOUNTER
----- Message from Jaquelyn Seip sent at 7/6/2022 11:12 AM EDT -----  Subject: Appointment Request    Reason for Call: Established Patient Appointment needed: Semi-Routine Skin   Problem    QUESTIONS    Reason for appointment request? No appointments available during search     Additional Information for Provider? patient has a rash on stomach, back,   skin that overlaps vaginal area, would like to be seen in the office,   please contact patient to advise, uses [facility] on [address]  ---------------------------------------------------------------------------  --------------  Dany RESTREPO  [phone]; OK to leave message on voicemail  ---------------------------------------------------------------------------  --------------  SCRIPT ANSWERS  COVID Screen: Farida Huff

## 2022-07-14 ENCOUNTER — OFFICE VISIT (OUTPATIENT)
Dept: FAMILY MEDICINE CLINIC | Age: 32
End: 2022-07-14
Payer: MEDICAID

## 2022-07-14 VITALS
SYSTOLIC BLOOD PRESSURE: 126 MMHG | BODY MASS INDEX: 33.02 KG/M2 | TEMPERATURE: 97.9 F | WEIGHT: 198.4 LBS | RESPIRATION RATE: 12 BRPM | DIASTOLIC BLOOD PRESSURE: 76 MMHG

## 2022-07-14 DIAGNOSIS — R21 RASH OF GROIN: ICD-10-CM

## 2022-07-14 DIAGNOSIS — L25.9 CONTACT DERMATITIS, UNSPECIFIED CONTACT DERMATITIS TYPE, UNSPECIFIED TRIGGER: Primary | ICD-10-CM

## 2022-07-14 DIAGNOSIS — R21 RASH: ICD-10-CM

## 2022-07-14 PROCEDURE — G8427 DOCREV CUR MEDS BY ELIG CLIN: HCPCS | Performed by: FAMILY MEDICINE

## 2022-07-14 PROCEDURE — G8417 CALC BMI ABV UP PARAM F/U: HCPCS | Performed by: FAMILY MEDICINE

## 2022-07-14 PROCEDURE — 99214 OFFICE O/P EST MOD 30 MIN: CPT | Performed by: FAMILY MEDICINE

## 2022-07-14 PROCEDURE — 1036F TOBACCO NON-USER: CPT | Performed by: FAMILY MEDICINE

## 2022-07-14 RX ORDER — HYDROXYZINE HYDROCHLORIDE 25 MG/1
25 TABLET, FILM COATED ORAL EVERY 8 HOURS PRN
Qty: 30 TABLET | Refills: 0 | Status: SHIPPED | OUTPATIENT
Start: 2022-07-14 | End: 2022-07-24

## 2022-07-14 ASSESSMENT — PATIENT HEALTH QUESTIONNAIRE - PHQ9
SUM OF ALL RESPONSES TO PHQ9 QUESTIONS 1 & 2: 0
2. FEELING DOWN, DEPRESSED OR HOPELESS: 0
SUM OF ALL RESPONSES TO PHQ QUESTIONS 1-9: 0
1. LITTLE INTEREST OR PLEASURE IN DOING THINGS: 0
SUM OF ALL RESPONSES TO PHQ QUESTIONS 1-9: 0

## 2022-07-14 NOTE — PROGRESS NOTES
Patient is here for rash , which started to belly and back 3 weeks ago . Initially it was patchy. No h/o eczema. Itching. Burning . Used Hydrocortisone and did not help right away, but improved after 2-3 days. Cold rag seemed to help. It was improving , but reflared on Monday . Using Hydrocortisone at night but not every night. No new soaps or detergents . She did wear new shirts 3 weeks ago . Irritation to groin bilateral, between buttock area. Using Vaseline and helps. It was open at one point but not now. Review of Systems    ROS: All other systems were reviewed and are negative . Patient's allergies and medications were reviewed. Patient's past medical, surgical, social , and family history were reviewed. OBJECTIVE:  /76   Temp 97.9 °F (36.6 °C) (Temporal)   Resp 12   Wt 198 lb 6.4 oz (90 kg)   BMI 33.02 kg/m²     Physical Exam    General: NAD, cooperative, alert and oriented X 3. Mood / affect is good. good insight. well hydrated. Neck : no lymphadenopathy, supple, FROM  CV: Regular rate and rhythm , no murmurs/ rub/ gallop. No edema. Lungs : CTA bilaterally, breathing comfortably  Abdomen: positive bowel sounds, soft , non tender, non distended. No hepatosplenomegaly. No CVA tenderness. Skin: fading rash to abdomen/ back. Non tender. Skin: no rash to groin now. ASSESSMENT/  PLAN:  1. Contact dermatitis, unspecified contact dermatitis type, unspecified trigger/ 2. Rash  - Fading. Likely related to new shirts. Avoid re-exposure without washing new shirts. - Hydrocortisone qhs prn .   - hydrOXYzine HCl (ATARAX) 25 MG tablet; Take 1 tablet by mouth every 8 hours as needed for Itching / rash  Dispense: 30 tablet; Refill: 0    3. Rash of groin  - Improved. Likely tinea bhupendra , but faded. F/u if no improvement 7-10d/ prn increased symptoms.

## 2022-10-13 ENCOUNTER — OFFICE VISIT (OUTPATIENT)
Dept: FAMILY MEDICINE CLINIC | Age: 32
End: 2022-10-13
Payer: MEDICAID

## 2022-10-13 VITALS
HEIGHT: 65 IN | WEIGHT: 198 LBS | SYSTOLIC BLOOD PRESSURE: 125 MMHG | TEMPERATURE: 98.3 F | DIASTOLIC BLOOD PRESSURE: 75 MMHG | HEART RATE: 75 BPM | RESPIRATION RATE: 14 BRPM | OXYGEN SATURATION: 100 % | BODY MASS INDEX: 32.99 KG/M2

## 2022-10-13 DIAGNOSIS — M79.675 TOE PAIN, LEFT: ICD-10-CM

## 2022-10-13 DIAGNOSIS — Z01.818 PREOP EXAMINATION: Primary | ICD-10-CM

## 2022-10-13 PROCEDURE — 99212 OFFICE O/P EST SF 10 MIN: CPT | Performed by: FAMILY MEDICINE

## 2022-10-13 PROCEDURE — G8417 CALC BMI ABV UP PARAM F/U: HCPCS | Performed by: FAMILY MEDICINE

## 2022-10-13 PROCEDURE — G8427 DOCREV CUR MEDS BY ELIG CLIN: HCPCS | Performed by: FAMILY MEDICINE

## 2022-10-13 PROCEDURE — G8484 FLU IMMUNIZE NO ADMIN: HCPCS | Performed by: FAMILY MEDICINE

## 2022-10-13 NOTE — PROGRESS NOTES
Chief Complaint:     Kendra Staley is a 34 y.o. female who presents for a preoperative physical examination. She is scheduled to have left foot surgery on fifth toe done by Dr. Michael Nelson at [facility] on 10/26/22. History of Present Illness:      Patient with h/o hammer toe surgery bilateral 7/14 and has had recurrence of left fifth toe pain with lump to lateral fifth toe . Patient denies chest pain, palpitations, or shortness of breath . No personal or family history of bleeding, clotting, or anesthesia problems. Past Medical History:   Diagnosis Date    Acne     Chlamydia infection 5/13    Dysmenorrhea     Fibrocystic breast disease     Fracture of radius, left, closed 2000    Hammertoe     Bilateral 5th digits    Herpes simplex     Iron deficiency     Pityriasis rosea         Review of patient's past surgical history indicates:     Past Surgical History:   Procedure Laterality Date    BREAST REDUCTION SURGERY  04/21/2022    Dr. Italia Booker Bilateral 4/21/2022    BILATERAL BREAST REDUCTION performed by Ian Faith MD at [address] [place] [address]  07/28/2014    HAMMERTOE DEFORMITY REPAIR BILATERAL 5TH DIGITS    UMBILICAL HERNIA REPAIR  01/01/1991                                                   No current outpatient medications on file. No current facility-administered medications for this visit. No Known Allergies    Social History     Tobacco Use    Smoking status: Never    Smokeless tobacco: Never   Vaping Use    Vaping Use: Some days    Substances: Flavoring   Substance Use Topics    Alcohol use:  Yes     Alcohol/week: 9.0 standard drinks     Types: 3 Standard drinks or equivalent, 6 Shots of liquor per week     Comment: socially- weekends    Drug use: No        Family History   Problem Relation Age of Onset    High Blood Pressure Mother     Diabetes Mother     High Blood Pressure Father         Review Of Systems    Skin: no abnormal pigmentation, rash, scaling, itching, masses, hair or nail changes  Eyes: negative  Ears/Nose/Throat: negative  Respiratory: negative  Cardiovascular: negative  Gastrointestinal: negative  Genitourinary: negative  Musculoskeletal: negative  Neurologic: negative  Psychiatric: negative  Hematologic/Lymphatic/Immunologic: negative  Endocrine: negative       Objective:        BP Readings from Last 3 Encounters:   10/13/22 125/75   07/14/22 126/76   05/26/22 117/74       /75   Pulse 75   Temp 98.3 °F (36.8 °C)   Resp 14   Ht 5' 5\" (1.651 m)   Wt 198 lb (89.8 kg)   SpO2 100%   BMI 32.95 kg/m²   General appearance - healthy, alert, no distress  Skin - Skin color, texture, turgor normal. No rashes or lesions. Head - Normocephalic. No masses, lesions, tenderness or abnormalities  Eyes - conjunctivae/corneas clear. PERRLA, EOM's intact. Ears - External ears normal. Canals clear. TM's normal.  Nose/Sinuses - Nares normal. Septum midline. Mucosa normal. No drainage or sinus tenderness. Oropharynx - Lips, mucosa, and tongue normal. Teeth and gums normal. Oropharynx  clear  Neck - Neck supple. No adenopathy. Thyroid symmetric, normal size,  Back - Back symmetric, no curvature. ROM normal. No CVA tenderness. Lungs - Percussion normal. Good diaphragmatic excursion. Lungs clear  Heart - Regular rate and rhythm, with no rub, murmur or gallop noted. No edema. Abdomen - Abdomen soft, non-tender. BS normal. No masses, organomegaly  Extremities - Extremities normal. No deformities, edema, or skin discoloration. Musculoskeletal - Spine ROM normal. Muscular strength intact. Left foot - lump to lateral fifth toe. Mild tenderness. Peripheral pulses - radial=4/4,, femoral=4/4, popliteal=4/4, dorsalis pedis=4/4,  Neuro - Gait normal. Reflexes normal and symmetric. Sensation grossly normal.  No focal weakness         Assessment:        Per encounter diagnoses  She is medically cleared for surgery and anesthesia. Avoid Aspirin, non steroidal anti inflammatory medications, including Motrin, Aleve, Ibuprofen, Advil; multi vitamins, Vitamin E, omega 3 fish oil, and glucosamine chondroitin for the 7 days prior to surgery. 1. Preop examination  - Medically cleared . 2. Toe pain, left  - Scheduled for surgery . Plan:          Per operating surgeon. See also orders filed with this encounter, if any.

## 2022-11-29 NOTE — PROGRESS NOTES
[facility]     PROCEDURE: BBR  DATE: 4/21/22    Mike Haynes has been recovering well since her procedure. Pain has been well controlled without pain medications. She notes that her symptoms of macromastia have completely resolved. She presents for discussion regarding panniculitis as well as possible abdominal contouring. She has had problems with rashes beneath her pannus and has been prescribed antimicrobial therapy from her PCP without improvement. She notes that there is also some discmofort that has limited her activities including ambulation. Given this, she asked about possible panniculectomy. Additionally, she also wanted information regarding contouring of her torso. PMHx:   Past Medical History:   Diagnosis Date    Acne     Chlamydia infection 5/13    Dysmenorrhea     Fibrocystic breast disease     Fracture of radius, left, closed 2000    Hammertoe     Bilateral 5th digits    Herpes simplex     Iron deficiency     Pityriasis rosea      PSHx:   Past Surgical History:   Procedure Laterality Date    BREAST REDUCTION SURGERY  04/21/2022    Dr. Claudette Fair Bilateral 4/21/2022    BILATERAL BREAST REDUCTION performed by Yobani Galicia MD at . [place] [address]  07/28/2014    HAMMERTOE DEFORMITY REPAIR BILATERAL 5TH DIGITS    UMBILICAL HERNIA REPAIR  01/01/1991     Allergy: No Known Allergies    SHx:   Social History     Socioeconomic History    Marital status: Single     Spouse name: Not on file    Number of children: Not on file    Years of education: Not on file    Highest education level: Not on file   Occupational History    Not on file   Tobacco Use    Smoking status: Never    Smokeless tobacco: Never   Vaping Use    Vaping Use: Some days    Substances: Flavoring   Substance and Sexual Activity    Alcohol use:  Yes     Alcohol/week: 9.0 standard drinks     Types: 3 Standard drinks or equivalent, 6 Shots of liquor per week Comment: socially- weekends    Drug use: No    Sexual activity: Never   Other Topics Concern    Not on file   Social History Narrative    Not on file     Social Determinants of Health     Financial Resource Strain: Low Risk     Difficulty of Paying Living Expenses: Not very hard   Food Insecurity: No Food Insecurity    Worried About Running Out of Food in the Last Year: Never true    Ran Out of Food in the Last Year: Never true   Transportation Needs: Not on file   Physical Activity: Not on file   Stress: Not on file   Social Connections: Not on file   Intimate Partner Violence: Not on file   Housing Stability: Not on file     FHx: Past history of breast CA: Yes (niece)   Past family members with breast reduction: Yes (maternal aunt)   Past family members with breast augmentation:No  Meds:   No current outpatient medications on file. No current facility-administered medications for this visit. ROS   Constitutional: Negative for chills and fever. HENT: Negative for congestion, facial swelling, and voice change. Eyes: Negative for photophobia and visual disturbance. Respiratory: Negative for apnea, cough, chest tightness and shortness of breath. Cardiovascular: Negative for chest pain and palpitations. Gastrointestinal: Negative for dysphagia and early satiety. Genitourinary: Negative for difficulty urinating, dysuria, flank pain, frequency and hematuria. Musculoskeletal: See HPI  Skin: See HPI  Endocrine: negative for tremors, temperature intolerance or polydipsia. Allergic/Immunologic: Negative for new environmental or food allergies. Neurological: Negative for dizziness, seizures, speech difficulty, numbness. Hematological: Negative for adenopathy. Psychiatric/Behavioral: Negative for agitation and confusion.     EXAM    /89   Pulse 72   Temp 97.8 °F (36.6 °C)   Ht 5' 5\" (1.651 m)   Wt 198 lb 9.6 oz (90.1 kg)   SpO2 97%   BMI 33.05 kg/m²     GEN: NAD   BREAST: Incisions well healed with good contour  Nipples viable  ABD: soft/NT/obese  No palpable hernia  Grade 2 pannus with some skin changes     IMP: 34 y. o.female s/p BBR now interested in potential panniculectomy   PLAN: Would benefit from panniculectomy for functional improvement. Additionally, would also benefit from concomitant abdominoplasty with liposuction 360 for improved contour. However, she understands the later is cosmetic and prior to any surgical intervention, she needs to get to her goal weight (which she can maintain). Once the later has been accomplished, she will return for evaluation.      Zion Alex MD  [address] [place] [address] [address] Reconstructive Surgery  [phone]  11/30/22

## 2022-11-30 ENCOUNTER — OFFICE VISIT (OUTPATIENT)
Dept: SURGERY | Age: 32
End: 2022-11-30
Payer: MEDICAID

## 2022-11-30 VITALS
DIASTOLIC BLOOD PRESSURE: 89 MMHG | HEIGHT: 65 IN | OXYGEN SATURATION: 97 % | WEIGHT: 198.6 LBS | HEART RATE: 72 BPM | SYSTOLIC BLOOD PRESSURE: 137 MMHG | BODY MASS INDEX: 33.09 KG/M2 | TEMPERATURE: 97.8 F

## 2022-11-30 DIAGNOSIS — Z09 POSTOP CHECK: Primary | ICD-10-CM

## 2022-11-30 DIAGNOSIS — M79.3 PANNICULITIS: ICD-10-CM

## 2022-11-30 PROCEDURE — G8484 FLU IMMUNIZE NO ADMIN: HCPCS | Performed by: SURGERY

## 2022-11-30 PROCEDURE — 99213 OFFICE O/P EST LOW 20 MIN: CPT | Performed by: SURGERY

## 2022-11-30 PROCEDURE — 1036F TOBACCO NON-USER: CPT | Performed by: SURGERY

## 2022-11-30 PROCEDURE — G8417 CALC BMI ABV UP PARAM F/U: HCPCS | Performed by: SURGERY

## 2022-11-30 PROCEDURE — G8427 DOCREV CUR MEDS BY ELIG CLIN: HCPCS | Performed by: SURGERY

## 2022-11-30 RX ORDER — IBUPROFEN 800 MG/1
TABLET ORAL
COMMUNITY
Start: 2022-11-10

## 2023-01-09 ENCOUNTER — APPOINTMENT (OUTPATIENT)
Dept: GENERAL RADIOLOGY | Age: 33
End: 2023-01-09
Payer: MEDICAID

## 2023-01-09 ENCOUNTER — HOSPITAL ENCOUNTER (EMERGENCY)
Age: 33
Discharge: HOME | End: 2023-01-09
Attending: STUDENT IN AN ORGANIZED HEALTH CARE EDUCATION/TRAINING PROGRAM
Payer: MEDICAID

## 2023-01-09 VITALS
BODY MASS INDEX: 33.63 KG/M2 | RESPIRATION RATE: 20 BRPM | HEIGHT: 64 IN | SYSTOLIC BLOOD PRESSURE: 140 MMHG | WEIGHT: 197 LBS | TEMPERATURE: 98 F | HEART RATE: 63 BPM | DIASTOLIC BLOOD PRESSURE: 97 MMHG | OXYGEN SATURATION: 99 %

## 2023-01-09 DIAGNOSIS — M25.531 RIGHT WRIST PAIN: Primary | ICD-10-CM

## 2023-01-09 DIAGNOSIS — S63.501A SPRAIN OF RIGHT WRIST, INITIAL ENCOUNTER: ICD-10-CM

## 2023-01-09 PROCEDURE — 99283 EMERGENCY DEPT VISIT LOW MDM: CPT

## 2023-01-09 PROCEDURE — 73110 X-RAY EXAM OF WRIST: CPT

## 2023-01-09 RX ORDER — OXYCODONE HYDROCHLORIDE 5 MG/1
5 TABLET ORAL EVERY 6 HOURS PRN
Qty: 12 TABLET | Refills: 0 | Status: SHIPPED | OUTPATIENT
Start: 2023-01-09 | End: 2023-01-12

## 2023-01-09 ASSESSMENT — PAIN - FUNCTIONAL ASSESSMENT: PAIN_FUNCTIONAL_ASSESSMENT: 0-10

## 2023-01-09 ASSESSMENT — PAIN DESCRIPTION - ORIENTATION: ORIENTATION: RIGHT

## 2023-01-09 ASSESSMENT — PAIN DESCRIPTION - LOCATION: LOCATION: WRIST

## 2023-01-09 ASSESSMENT — PAIN DESCRIPTION - FREQUENCY: FREQUENCY: CONTINUOUS

## 2023-01-09 ASSESSMENT — PAIN DESCRIPTION - PAIN TYPE: TYPE: ACUTE PAIN

## 2023-01-09 ASSESSMENT — PAIN DESCRIPTION - DESCRIPTORS: DESCRIPTORS: DISCOMFORT

## 2023-01-09 ASSESSMENT — PAIN SCALES - GENERAL: PAINLEVEL_OUTOF10: 6

## 2023-01-09 NOTE — DISCHARGE INSTRUCTIONS
You were seen in the emergency department for right wrist pain. Fortunately your x-ray did not show any fracture. I would like for you to follow-up with the on-call orthopedic doctor regarding this. You can return to the emergency department at anytime with any new or concerning changes to your health.

## 2023-01-09 NOTE — Clinical Note
Sommer Meyers was seen and treated in our emergency department on 1/9/2023. She may return to work on 01/12/2023. If you have any questions or concerns, please don't hesitate to call.       Elzbieta Blakely MD

## 2023-01-09 NOTE — ED PROVIDER NOTES
Emergency Department Provider Note  Location: [address] [place] [address]  1/9/2023     Patient Identification  Katelynn Hdez is a 34 y.o. female    Chief Complaint  Wrist Pain (Pt to ED with complaint of right wrist pain that started yesterday at work after she was helping a patient in the bed. Pt states she caught his body on her right wrist. Pt wearing wrist splint. )      Mode of Arrival  private car    HPI  (History provided by patient)  This is a 34 y.o. female with a PMH significant for fracture of the left radius  presented today for wrist pain. Patient reports that she was at work helping a patient when she caught his body on her right wrist.  Since then she has had increasing pain. Injury happened last night. Pain is worse with any movement. She denies any new numbness or tingling. She denies any recent fever, cough, chest pain, shortness of breath, abdominal pain, nausea, vomiting, changes in bowel or bladder, syncope. She took 800 mg of ibuprofen for pain. She has been wearing a wrist splint due to symptoms. ROS  Review of Systems   All other systems reviewed and are negative. I have reviewed the following nursing documentation:  Allergies: No Known Allergies    Past medical history:  has a past medical history of Acne, Chlamydia infection (5/13), Dysmenorrhea, Fibrocystic breast disease, Fracture of radius, left, closed (2000), Hammertoe, Herpes simplex, Iron deficiency, and Pityriasis rosea. Past surgical history:  has a past surgical history that includes Umbilical hernia repair (01/01/1991); other surgical history (07/28/2014); Breast reduction surgery (04/21/2022); and Breast reduction surgery (Bilateral, 4/21/2022). Home medications:   Prior to Admission medications    Medication Sig Start Date End Date Taking? Authorizing Provider   oxyCODONE (ROXICODONE) 5 MG immediate release tablet Take 1 tablet by mouth every 6 hours as needed for Pain for up to 3 days. Intended supply: 3 days. Take lowest dose possible to manage pain Max Daily Amount: 20 mg 1/9/23 1/12/23 Yes Nova Claros MD   ibuprofen (ADVIL;MOTRIN) 800 MG tablet  11/10/22   Historical Provider, MD       Social history:  reports that she has never smoked. She has never used smokeless tobacco. She reports current alcohol use of about 9.0 standard drinks per week. She reports that she does not use drugs. Family history:    Family History   Problem Relation Age of Onset    High Blood Pressure Mother     Diabetes Mother     High Blood Pressure Father        Exam  ED Triage Vitals   BP Temp Temp src Pulse Resp SpO2 Height Weight   -- -- -- -- -- -- -- --       Physical Exam  Vitals and nursing note reviewed. Constitutional:       General: She is not in acute distress. Appearance: Normal appearance. She is not toxic-appearing. HENT:      Head: Normocephalic and atraumatic. Right Ear: External ear normal.      Left Ear: External ear normal.      Nose: Nose normal.      Mouth/Throat:      Pharynx: Oropharynx is clear. No oropharyngeal exudate or posterior oropharyngeal erythema. Eyes:      Extraocular Movements: Extraocular movements intact. Conjunctiva/sclera: Conjunctivae normal.   Cardiovascular:      Rate and Rhythm: Normal rate and regular rhythm. Pulses: Normal pulses. Heart sounds: Normal heart sounds. Pulmonary:      Effort: Pulmonary effort is normal.      Breath sounds: Normal breath sounds. Abdominal:      General: There is no distension. Palpations: Abdomen is soft. Tenderness: There is no abdominal tenderness. Musculoskeletal:         General: Signs of injury present. No deformity. Cervical back: Normal range of motion and neck supple. Comments: Right wrist exam  No tenderness to palpation, pain with eversion and inversion. She has some pain along the TFCC. Neurovascularly intact   Skin:     General: Skin is warm.       Capillary Refill: Capillary refill takes less than 2 seconds. Coloration: Skin is not jaundiced. Findings: No bruising. Neurological:      General: No focal deficit present. Mental Status: She is alert. Cranial Nerves: No cranial nerve deficit. Psychiatric:         Mood and Affect: Mood normal.         Behavior: Behavior normal.         MDM/ED Course  ED Medication Orders (From admission, onward)      None            EKG  No EKG obtained this visit      Radiology  XR WRIST RIGHT (MIN 3 VIEWS)    Result Date: 1/9/2023  XR WRIST RIGHT (MIN 3 VIEWS) Indication: right wrist injury right wrist pain COMPARISON: None Findings: 3 views right wrist were obtained. The bones are well-mineralized. There is no acute fracture or dislocation. The joint spaces are preserved. The regional soft tissues are unremarkable. Impression: No acute osseous abnormality. Labs  No results found for this visit on 01/09/23.      - Patient seen and evaluated in room 9.  34 y.o. female presented for right wrist pain. She presented hypertensive, afebrile, normal HR, normal RR and O2 saturation. On exam she had minimal tenderness to palpation. She had full range of motion and is neurovascularly intact. Given history and exam my differential diagnosis includes but is not limited to distal radius or ulnar fracture, TFCC injury, ECU injury. She has no neurovascular deficits. I did obtain an x-ray as noted below    - Patient was placed on telemetry during her ED stay and no malignant dysrhythmia observed. - Pertinent old records reviewed. - Patient politely declined any pain medication in the ED. Upon reassessment, patient remained hemodynamically stable. - Diagnostic studies reviewed and interpreted by me     -Three-view x-ray of the right wrist with no acute fracture or abnormality    - I discussed the results with patient.   We agreed to discharge home with short course of pain medication, orthopedic follow-up and new right-sided wrist brace. All questions and concerns were addressed. - Return precautions also discussed. patient verbalized understanding of care plan and agreed to follow-up with orthopedic surgery and PCP as advised. Clinical Impression:  1. Right wrist pain    2. Sprain of right wrist, initial encounter          Disposition:  Discharge to home in good condition. Blood pressure (!) 140/97, pulse 63, temperature 98 °F (36.7 °C), temperature source Oral, resp. rate 20, height 5' 4\" (1.626 m), weight 197 lb (89.4 kg), SpO2 99 %, not currently breastfeeding. Patient was given scripts for the following medications. I counseled patient how to take these medications. New Prescriptions    OXYCODONE (ROXICODONE) 5 MG IMMEDIATE RELEASE TABLET    Take 1 tablet by mouth every 6 hours as needed for Pain for up to 3 days. Intended supply: 3 days. Take lowest dose possible to manage pain Max Daily Amount: 20 mg       Disposition referral (if applicable):  Cassidy Guthrie MD  [address]  [address] [place] [address] [address]  [address] [place] [address]  [phone]      To schedule an appointment    This chart was generated in part by using Dragon Dictation system and may contain errors related to that system including errors in grammar, punctuation, and spelling, as well as words and phrases that may be inappropriate. If there are any questions or concerns please feel free to contact the dictating provider for clarification.      Beean Solorzano MD  [address] [facility]       Beena Solorzano MD  01/09/23 [id number]

## 2023-01-09 NOTE — ED NOTES
All follow up care discussed. Pt verbalized understandings. Prescriptions given. No other concerns voiced.  Stable and ambulatory upon dismissal.      Atilio Katz RN  01/09/23 [id number]

## 2023-08-09 ENCOUNTER — OFFICE VISIT (OUTPATIENT)
Dept: SURGERY | Age: 33
End: 2023-08-09
Payer: MEDICAID

## 2023-08-09 VITALS
SYSTOLIC BLOOD PRESSURE: 121 MMHG | DIASTOLIC BLOOD PRESSURE: 84 MMHG | BODY MASS INDEX: 34.33 KG/M2 | TEMPERATURE: 98.1 F | OXYGEN SATURATION: 98 % | HEART RATE: 65 BPM | WEIGHT: 200 LBS

## 2023-08-09 DIAGNOSIS — M79.3 PANNICULITIS: Primary | ICD-10-CM

## 2023-08-09 PROCEDURE — G8427 DOCREV CUR MEDS BY ELIG CLIN: HCPCS | Performed by: SURGERY

## 2023-08-09 PROCEDURE — 99213 OFFICE O/P EST LOW 20 MIN: CPT | Performed by: SURGERY

## 2023-08-09 PROCEDURE — G8417 CALC BMI ABV UP PARAM F/U: HCPCS | Performed by: SURGERY

## 2023-08-09 PROCEDURE — 1036F TOBACCO NON-USER: CPT | Performed by: SURGERY

## 2023-08-09 NOTE — PROGRESS NOTES
[facility]     PROCEDURE: BBR  DATE: 4/21/22    Leo Tang has been recovering well since her procedure. Pain has been well controlled without pain medications. She notes that her symptoms of macromastia have completely resolved. She presents for discussion regarding panniculitis as well as possible abdominal contouring. She has had problems with rashes beneath her pannus and has been prescribed antimicrobial therapy from her PCP without improvement. She notes that there is also some discmofort that has limited her activities including ambulation. Given this, she asked about possible panniculectomy. Additionally, she also wanted information regarding contouring of her torso. Since her last evaluation, she is interested in continuing to move forward with panniculectomy and abdominoplasty. She has reached weight stability and notes that when she loses weight, she is losing from areas that she doesn't want to and continues to have weight in her lower abdomen.     PMHx:   Past Medical History:   Diagnosis Date    Acne     Chlamydia infection 5/13    Dysmenorrhea     Fibrocystic breast disease     Fracture of radius, left, closed 2000    Hammertoe     Bilateral 5th digits    Herpes simplex     Iron deficiency     Pityriasis rosea      PSHx:   Past Surgical History:   Procedure Laterality Date    BREAST REDUCTION SURGERY  04/21/2022    Dr. Des Hammonds Bilateral 4/21/2022    BILATERAL BREAST REDUCTION performed by Laurie Strickland MD at [address] [place] [address]  07/28/2014    HAMMERTOE DEFORMITY REPAIR BILATERAL 5TH DIGITS    UMBILICAL HERNIA REPAIR  01/01/1991     Allergy: No Known Allergies    SHx:   Social History     Socioeconomic History    Marital status: Single     Spouse name: Not on file    Number of children: Not on file    Years of education: Not on file    Highest education level: Not on file   Occupational History    Not on file   Tobacco

## (undated) DEVICE — SYSTEM SKIN CLOSURE 42CM DERMABOND PRINEO

## (undated) DEVICE — SOLUTION IV 1000ML 0.9% SOD CHL

## (undated) DEVICE — TOTAL TRAY, 16FR 10ML SIL FOLEY, URN: Brand: MEDLINE

## (undated) DEVICE — INTENDED USE FOR SURGICAL MARKING ON INTACT SKIN, ALSO PROVIDES A PERMANENT METHOD OF IDENTIFYING OBJECTS IN THE OPERATING ROOM: Brand: WRITESITE® PLUS MINI PREP RESISTANT MARKER

## (undated) DEVICE — PAD FOAM 11.75X7-7/8 IN RESTON LF

## (undated) DEVICE — Device

## (undated) DEVICE — TOWEL,STOP FLAG GOLD N-W: Brand: MEDLINE

## (undated) DEVICE — APPLIER LIG CLP M L11IN TI STR RNG HNDL FOR 20 CLP DISP

## (undated) DEVICE — SUTURE MCRYL SZ 3-0 L27IN ABSRB UD L19MM PS-2 3/8 CIR PRIM Y427H

## (undated) DEVICE — DRESSING GERM DIA1IN CNTR H DIA7MM BLU CHG ANTIMIC PROTCT

## (undated) DEVICE — GLOVE ORANGE PI 7 1/2   MSG9075

## (undated) DEVICE — GLOVE ORANGE PI 8 1/2   MSG9085

## (undated) DEVICE — DRAIN SURG 15FR L3 16IN DIA47MM SIL RND HUBLESS FULL FLUT

## (undated) DEVICE — PLASMABLADE PS210-030S 3.0S LOCK: Brand: PLASMABLADE™

## (undated) DEVICE — BLANKET WRM W40.2XL55.9IN IORT LO BODY + MISTRAL AIR

## (undated) DEVICE — 3M™ TEGADERM™ TRANSPARENT FILM DRESSING FRAME STYLE, 1624W, 2-3/8 IN X 2-3/4 IN (6 CM X 7 CM), 100/CT 4CT/CASE: Brand: 3M™ TEGADERM™

## (undated) DEVICE — TOWEL,OR,DSP,ST,BLUE,DLX,8/PK,10PK/CS: Brand: MEDLINE

## (undated) DEVICE — PLASTIC MAJOR: Brand: MEDLINE INDUSTRIES, INC.

## (undated) DEVICE — STAPLER SKIN H3.9MM WIRE DIA0.58MM CRWN 6.9MM 35 STPL ROT

## (undated) DEVICE — SUTURE STRATAFIX SPRL MCRYL + SZ 3-0 L18IN ABSRB UD PS-2 SXMP1B107

## (undated) DEVICE — 1000 S-DRAPE TOWEL DRAPE 10/BX: Brand: STERI-DRAPE™

## (undated) DEVICE — SYRINGE IRRIG 60ML SFT PLIABLE BLB EZ TO GRP 1 HND USE W/

## (undated) DEVICE — ST FLUFF LG 1 PLY: Brand: DEROYAL

## (undated) DEVICE — SUTURE PERMA-HAND 0 L18IN NONABSORBABLE BLK CT-1 L36MM 1/2 C021D